# Patient Record
Sex: MALE | Race: AMERICAN INDIAN OR ALASKA NATIVE | NOT HISPANIC OR LATINO | Employment: OTHER | URBAN - METROPOLITAN AREA
[De-identification: names, ages, dates, MRNs, and addresses within clinical notes are randomized per-mention and may not be internally consistent; named-entity substitution may affect disease eponyms.]

---

## 2018-01-13 NOTE — RESULT NOTES
Message   call, labs ok     Verified Results  (1) COMPREHENSIVE METABOLIC PANEL [id number] 56:28VV Kate Epperson Order Number: [id number]      National Kidney Disease Education Program recommendations are as follows:  GFR calculation is accurate only with a steady state creatinine  Chronic Kidney disease less than 60 ml/min/1 73 sq  meters  Kidney failure less than 15 ml/min/1 73 sq  meters  Test Name Result Flag Reference   GLUCOSE,RANDM 86 mg/dL     If the patient is fasting, the ADA then defines impaired fasting glucose as > 100 mg/dL and diabetes as > or equal to 123 mg/dL  SODIUM 143 mmol/L  136-145   POTASSIUM 4 3 mmol/L  3 5-5 3   CHLORIDE 109 mmol/L H 100-108   CARBON DIOXIDE 28 mmol/L  21-32   ANION GAP (CALC) 6 mmol/L  4-13   BLOOD UREA NITROGEN 12 mg/dL  5-25   CREATININE 0 92 mg/dL  0 60-1 30   Standardized to IDMS reference method   CALCIUM 8 6 mg/dL  8 3-10 1   BILI, TOTAL 0 52 mg/dL  0 20-1 00   ALK PHOSPHATAS 61 U/L     ALT (SGPT) 24 U/L  12-78   AST(SGOT) 9 U/L  5-45   ALBUMIN 3 9 g/dL  3 5-5 0   TOTAL PROTEIN 7 1 g/dL  6 4-8 2   eGFR Non-African American      >60 0 ml/min/1 73sq m     (1) LIPID PANEL, FASTING 01Apr2016 09:32AM Kaya Guzman   PRASHANT Order Number: [id number]      Triglyceride:         Normal              <150 mg/dl       Borderline High    150-199 mg/dl       High               200-499 mg/dl       Very High          >499 mg/dl  Cholesterol:         Desirable        <200 mg/dl      Borderline High  200-239 mg/dl      High             >239 mg/dl  HDL Cholesterol:        High    >59 mg/dL      Low     <41 mg/dL     Test Name Result Flag Reference   CHOLESTEROL 187 mg/dL     HDL,DIRECT 50 mg/dL  40-60   LDL CHOLESTEROL CALCULATED 112 mg/dL H 0-100   TRIGLYCERIDES 125 mg/dL  <=150   Specimen collection should occur prior to N-Acetylcysteine or Metamizole administration due to the potential for falsely depressed results       (1) CBC/PLT/DIFF 01Apr2016 09:32AM Anuradha Mcmahon Order Number: [id number]    TW Order Number: [id number]     Test Name Result Flag Reference   WBC COUNT 7 38 Thousand/uL  4 31-10 16   RBC COUNT 5 10 Million/uL  3 88-5 62   HEMOGLOBIN 16 3 g/dL  12 0-17 0   HEMATOCRIT 47 2 %  36 5-49 3   MCV 93 fL  82-98   MCH 32 0 pg  26 8-34 3   MCHC 34 5 g/dL  31 4-37 4   RDW 13 5 %  11 6-15 1   MPV 11 7 fL  8 9-12 7   PLATELET COUNT 452 Thousands/uL  149-390   nRBC AUTOMATED 0 /100 WBCs     NEUTROPHILS RELATIVE PERCENT 58 %  43-75   LYMPHOCYTES RELATIVE PERCENT 33 %  14-44   MONOCYTES RELATIVE PERCENT 6 %  4-12   EOSINOPHILS RELATIVE PERCENT 3 %  0-6   BASOPHILS RELATIVE PERCENT 0 %  0-1   NEUTROPHILS ABSOLUTE COUNT 4 22 Thousands/µL  1 85-7 62   LYMPHOCYTES ABSOLUTE COUNT 2 43 Thousands/µL  0 60-4 47   MONOCYTES ABSOLUTE COUNT 0 43 Thousand/µL  0 17-1 22   EOSINOPHILS ABSOLUTE COUNT 0 25 Thousand/µL  0 00-0 61   BASOPHILS ABSOLUTE COUNT 0 03 Thousands/µL  0 00-0 10     (1) TSH 01Apr2016 09:32AM Amirah Waggoner    Order Number: [id number]    Patients undergoing fluorescein dye angiography may retain small amounts of fluorescein in the body for 48-72 hours post procedure  Samples containing fluorescein can produce falsely depressed TSH values  If the patient had this procedure,a specimen should be resubmitted post fluorescein clearance       Test Name Result Flag Reference   TSH 1 290 uIU/mL  0 358-3 740

## 2020-02-14 ENCOUNTER — APPOINTMENT (EMERGENCY)
Dept: RADIOLOGY | Facility: HOSPITAL | Age: 55
End: 2020-02-14
Payer: MEDICARE

## 2020-02-14 ENCOUNTER — HOSPITAL ENCOUNTER (EMERGENCY)
Facility: HOSPITAL | Age: 55
End: 2020-02-15
Attending: EMERGENCY MEDICINE | Admitting: EMERGENCY MEDICINE
Payer: MEDICARE

## 2020-02-14 ENCOUNTER — APPOINTMENT (EMERGENCY)
Dept: CT IMAGING | Facility: HOSPITAL | Age: 55
End: 2020-02-14
Payer: MEDICARE

## 2020-02-14 DIAGNOSIS — F31.9 BIPOLAR DISORDER (HCC): Primary | ICD-10-CM

## 2020-02-14 DIAGNOSIS — F20.9 SCHIZOPHRENIA (HCC): ICD-10-CM

## 2020-02-14 LAB
ALBUMIN SERPL BCP-MCNC: 3.7 G/DL (ref 3.5–5)
ALP SERPL-CCNC: 55 U/L (ref 46–116)
ALT SERPL W P-5'-P-CCNC: 31 U/L (ref 12–78)
ANION GAP SERPL CALCULATED.3IONS-SCNC: 13 MMOL/L (ref 4–13)
AST SERPL W P-5'-P-CCNC: 20 U/L (ref 5–45)
BASOPHILS # BLD AUTO: 0.02 THOUSANDS/ΜL (ref 0–0.1)
BASOPHILS NFR BLD AUTO: 0 % (ref 0–1)
BILIRUB DIRECT SERPL-MCNC: 0.34 MG/DL (ref 0–0.2)
BILIRUB SERPL-MCNC: 0.8 MG/DL (ref 0.2–1)
BUN SERPL-MCNC: 19 MG/DL (ref 5–25)
CALCIUM SERPL-MCNC: 9.3 MG/DL (ref 8.3–10.1)
CHLORIDE SERPL-SCNC: 102 MMOL/L (ref 100–108)
CO2 SERPL-SCNC: 24 MMOL/L (ref 21–32)
CREAT SERPL-MCNC: 1.1 MG/DL (ref 0.6–1.3)
EOSINOPHIL # BLD AUTO: 0.01 THOUSAND/ΜL (ref 0–0.61)
EOSINOPHIL NFR BLD AUTO: 0 % (ref 0–6)
ERYTHROCYTE [DISTWIDTH] IN BLOOD BY AUTOMATED COUNT: 13 % (ref 11.6–15.1)
ETHANOL EXG-MCNC: 0 MG/DL
GFR SERPL CREATININE-BSD FRML MDRD: 76 ML/MIN/1.73SQ M
GLUCOSE SERPL-MCNC: 90 MG/DL (ref 65–140)
HCT VFR BLD AUTO: 44.6 % (ref 36.5–49.3)
HGB BLD-MCNC: 15.2 G/DL (ref 12–17)
IMM GRANULOCYTES # BLD AUTO: 0.04 THOUSAND/UL (ref 0–0.2)
IMM GRANULOCYTES NFR BLD AUTO: 0 % (ref 0–2)
LYMPHOCYTES # BLD AUTO: 1.08 THOUSANDS/ΜL (ref 0.6–4.47)
LYMPHOCYTES NFR BLD AUTO: 12 % (ref 14–44)
MCH RBC QN AUTO: 30.8 PG (ref 26.8–34.3)
MCHC RBC AUTO-ENTMCNC: 34.1 G/DL (ref 31.4–37.4)
MCV RBC AUTO: 90 FL (ref 82–98)
MONOCYTES # BLD AUTO: 0.52 THOUSAND/ΜL (ref 0.17–1.22)
MONOCYTES NFR BLD AUTO: 6 % (ref 4–12)
NEUTROPHILS # BLD AUTO: 7.29 THOUSANDS/ΜL (ref 1.85–7.62)
NEUTS SEG NFR BLD AUTO: 82 % (ref 43–75)
NRBC BLD AUTO-RTO: 0 /100 WBCS
PLATELET # BLD AUTO: 233 THOUSANDS/UL (ref 149–390)
PMV BLD AUTO: 11 FL (ref 8.9–12.7)
POTASSIUM SERPL-SCNC: 3.6 MMOL/L (ref 3.5–5.3)
PROT SERPL-MCNC: 7.7 G/DL (ref 6.4–8.2)
RBC # BLD AUTO: 4.94 MILLION/UL (ref 3.88–5.62)
SODIUM SERPL-SCNC: 139 MMOL/L (ref 136–145)
WBC # BLD AUTO: 8.96 THOUSAND/UL (ref 4.31–10.16)

## 2020-02-14 PROCEDURE — 73560 X-RAY EXAM OF KNEE 1 OR 2: CPT

## 2020-02-14 PROCEDURE — 80048 BASIC METABOLIC PNL TOTAL CA: CPT | Performed by: EMERGENCY MEDICINE

## 2020-02-14 PROCEDURE — 36415 COLL VENOUS BLD VENIPUNCTURE: CPT | Performed by: EMERGENCY MEDICINE

## 2020-02-14 PROCEDURE — 70450 CT HEAD/BRAIN W/O DYE: CPT

## 2020-02-14 PROCEDURE — 82075 ASSAY OF BREATH ETHANOL: CPT | Performed by: EMERGENCY MEDICINE

## 2020-02-14 PROCEDURE — 96360 HYDRATION IV INFUSION INIT: CPT

## 2020-02-14 PROCEDURE — 93005 ELECTROCARDIOGRAM TRACING: CPT

## 2020-02-14 PROCEDURE — 99285 EMERGENCY DEPT VISIT HI MDM: CPT | Performed by: EMERGENCY MEDICINE

## 2020-02-14 PROCEDURE — 85025 COMPLETE CBC W/AUTO DIFF WBC: CPT | Performed by: EMERGENCY MEDICINE

## 2020-02-14 PROCEDURE — 99285 EMERGENCY DEPT VISIT HI MDM: CPT

## 2020-02-14 PROCEDURE — 80076 HEPATIC FUNCTION PANEL: CPT | Performed by: EMERGENCY MEDICINE

## 2020-02-14 RX ADMIN — SODIUM CHLORIDE 1000 ML: 0.9 INJECTION, SOLUTION INTRAVENOUS at 17:46

## 2020-02-14 NOTE — ED NOTES
Patient unable to maintain air stream sufficient for POCT alcohol breath test      Chen Barkley  02/14/20 4309

## 2020-02-14 NOTE — LETTER
[address] [facility] [address]  [address] [facility] [address]  Dept: [phone]      EMTALA TRANSFER CONSENT    NAME Huey Taylor                              1965                              MRN [id number]    I have been informed of my rights regarding examination, treatment, and transfer   by Dr Samantha Rincon MD    Benefits: Specialized equipment and/or services available at the receiving facility (Include comment)________________________    Risks: Potential for delay in receiving treatment      Consent for Transfer:  I acknowledge that my medical condition has been evaluated and explained to me by the emergency department physician or other qualified medical person and/or my attending physician, who has recommended that I be transferred to the service of  Accepting Physician: Dr Jaleel Connors at [address] [place] [address] Name, [address] : [address], [address] [place] [address], [place] [address]  The above potential benefits of such transfer, the potential risks associated with such transfer, and the probable risks of not being transferred have been explained to me, and I fully understand them  The doctor has explained that, in my case, the benefits of transfer outweigh the risks  I agree to be transferred  I authorize the performance of emergency medical procedures and treatments upon me in both transit and upon arrival at the receiving facility  Additionally, I authorize the release of any and all medical records to the receiving facility and request they be transported with me, if possible  I understand that the safest mode of transportation during a medical emergency is an ambulance and that the Hospital advocates the use of this mode of transport   Risks of traveling to the receiving facility by car, including absence of medical control, life sustaining equipment, such as oxygen, and medical personnel has been explained to me and I fully understand them     ([facility])  [X]  I consent to the stated transfer and to be transported by ambulance/helicopter  [  ]  I consent to the stated transfer, but refuse transportation by ambulance and accept full responsibility for my transportation by car  I understand the risks of non-ambulance transfers and I exonerate the Hospital and its staff from any deterioration in my condition that results from this refusal     X___________________________________________    DATE  02/15/20  TIME________  Signature of patient or legally responsible individual signing on patient behalf           RELATIONSHIP TO PATIENT_________________________          Provider Certification    NAME [address] [place] [address] 1965                              MRN [id number]    A medical screening exam was performed on the above named patient  Based on the examination:    Condition Necessitating Transfer The primary encounter diagnosis was Bipolar disorder ([facility] [address] )  A diagnosis of Schizophrenia ([facility] [address] ) was also pertinent to this visit      Patient Condition: The patient has been stabilized such that within reasonable medical probability, no material deterioration of the patient condition or the condition of the unborn child(juanito) is likely to result from the transfer    Reason for Transfer: Level of Care needed not available at this facility    Transfer Requirements: [address]  [place], [address] [place] [address] Drive, [address] [facility] [address]   · Space available and qualified personnel available for treatment as acknowledged by Chetna Adams, [address] [facility], [phone]  · Agreed to accept transfer and to provide appropriate medical treatment as acknowledged by       Dr Evert Toscano  · Appropriate medical records of the examination and treatment of the patient are provided at the time of transfer   [address] _______  · Transfer will be performed by qualified personnel from                              and appropriate transfer equipment as required, including the use of necessary and appropriate life support measures  Provider Certification: I have examined the patient and explained the following risks and benefits of being transferred/refusing transfer to the patient/family:         Based on these reasonable risks and benefits to the patient and/or the unborn child(juanito), and based upon the information available at the time of the patients examination, I certify that the medical benefits reasonably to be expected from the provision of appropriate medical treatments at another medical facility outweigh the increasing risks, if any, to the individuals medical condition, and in the case of labor to the unborn child, from effecting the transfer      X____________________________________________ DATE 02/15/20        TIME_______      ORIGINAL - SEND TO MEDICAL RECORDS   COPY - SEND WITH PATIENT DURING TRANSFER

## 2020-02-14 NOTE — ED PROVIDER NOTES
History  Chief Complaint   Patient presents with    Psychiatric Evaluation     pt brought via police  police report pt requesting 201 after being informed of arrest  pt states " i am god " and that his medications are "off " pt reports taking medications daily  denies SI/HI/VH/AH  HPI    56-year-old male with a chief complaint that "He is God"  Patient is disheveled covered in mud and is in the room with the  and apparently patient was being arrested however he requested to come to the hospital after he knew of his arrest   Patient states that he is bipolar manic-depressive and schizophrenic and is supposed to be taking Zyprexa & does not remember the last time he took it  Patient states he was in his car and all he wants to do is pray and read the Bible because "he is God "  Patient states that he has 3 children and 2 of them are supposed to come from [place] tomorrow to see him  Patient states that he is   Patient is difficult to understand and "mumbles"  Patient denies any suicidal thoughts and denies any homicidal thoughts  Patient states that he does not want to hurt anyone  Patient denies any drug use but states he has used it in the past a long time ago  Patient denies any alcohol use  Patient does state that he used to go to the bar but he has not been there in a while  None       Past Medical History:   Diagnosis Date    Bipolar disorder ([facility] [address] )     [facility]     Psychiatric disorder     Schizophrenia ([facility] )        History reviewed  No pertinent surgical history  History reviewed  No pertinent family history  I have reviewed and agree with the history as documented  Social History     Tobacco Use    Smoking status: Unknown If Ever Smoked    Smokeless tobacco: Never Used   Substance Use Topics    Alcohol use: Yes    Drug use: Yes     Types: Methamphetamines       Review of Systems   Constitutional: Negative for diaphoresis, fatigue and fever  HENT: Negative for congestion, ear pain, nosebleeds and sore throat          + head pain   Eyes: Negative for photophobia, pain, discharge and visual disturbance  Respiratory: Negative for cough, choking, chest tightness, shortness of breath and wheezing  Cardiovascular: Negative for chest pain and palpitations  Gastrointestinal: Negative for abdominal distention, abdominal pain, diarrhea and vomiting  Genitourinary: Negative for dysuria, flank pain and frequency  Musculoskeletal: Positive for arthralgias and myalgias  Negative for back pain, gait problem and joint swelling         + knee pain   Skin: Negative for color change and rash  Neurological: Negative for dizziness, syncope and headaches  Psychiatric/Behavioral: Negative for behavioral problems, confusion and suicidal ideas  The patient is not nervous/anxious  + delusions - "thinks he is God"   All other systems reviewed and are negative  Physical Exam  Physical Exam   Constitutional: He is oriented to person, place, and time  60-year-old disheveled male poorly groomed presents handcuffed to the stretcher with police at his side  Patient keeps repeating that "He is God"  Patient is covered with wet mud  HENT:   Head: Normocephalic  Mouth/Throat: Oropharynx is clear and moist    Patient complains of a bump to the left side of his head  Eyes: Pupils are equal, round, and reactive to light  EOM are normal    Positive cataract surgery   Neck: Normal range of motion  Neck supple  Cardiovascular: Regular rhythm and normal heart sounds  Slightly tachy   Pulmonary/Chest: Effort normal and breath sounds normal  No stridor  No respiratory distress  He has no wheezes  He has no rales  Abdominal: Soft  Bowel sounds are normal  He exhibits no distension  There is no tenderness  There is no guarding  Patient's abdomen is also covered in mud  Musculoskeletal: Normal range of motion  He exhibits tenderness     Patient has tenderness to bilateral knees but is able to flex and extend them without difficulty  Neurological: He is alert and oriented to person, place, and time  No cranial nerve deficit  He exhibits normal muscle tone  Skin: Skin is warm and dry  Psychiatric:   Patient keeps mumbling that "He is God"  Patient denies any suicidal or homicidal ideations  Nursing note and vitals reviewed        Vital Signs  ED Triage Vitals [02/14/20 1614]   Temperature Pulse Respirations Blood Pressure SpO2   98 6 °F (37 °C) 102 12 121/85 98 %      Temp Source Heart Rate Source Patient Position - Orthostatic VS BP Location FiO2 (%)   Oral Monitor Lying Left arm --      Pain Score       No Pain           Vitals:    02/14/20 1614 02/14/20 1937   BP: 121/85 120/78   Pulse: 102 89   Patient Position - Orthostatic VS: Lying Lying         Visual Acuity      ED Medications  Medications   sodium chloride 0 9 % bolus 1,000 mL (0 mL Intravenous Stopped 2/14/20 1905)       Diagnostic Studies  Results Reviewed     Procedure Component Value Units Date/Time    POCT alcohol breath test [[id number]]  (Normal) Resulted:  02/14/20 1935    Lab Status:  Final result Updated:  02/14/20 1935     EXTBreath Alcohol 0 000    Ethanol [[id number]]     Lab Status:  No result Specimen:  Blood     Basic metabolic panel [[id number]] Collected:  02/14/20 1747    Lab Status:  Final result Specimen:  Blood from Arm, Left Updated:  02/14/20 1808     Sodium 139 mmol/L      Potassium 3 6 mmol/L      Chloride 102 mmol/L      CO2 24 mmol/L      ANION GAP 13 mmol/L      BUN 19 mg/dL      Creatinine 1 10 mg/dL      Glucose 90 mg/dL      Calcium 9 3 mg/dL      eGFR 76 ml/min/1 73sq m     Narrative:       [facility] guidelines for Chronic Kidney Disease (CKD):     Stage 1 with normal or high GFR (GFR > 90 mL/min/1 73 square meters)    Stage 2 Mild CKD (GFR = 60-89 mL/min/1 73 square meters)    Stage 3A Moderate CKD (GFR = 45-59 mL/min/1 73 square meters)    Stage 3B Moderate CKD (GFR = 30-44 mL/min/1 73 square meters)    Stage 4 Severe CKD (GFR = 15-29 mL/min/1 73 square meters)    Stage 5 End Stage CKD (GFR <15 mL/min/1 73 square meters)  Note: GFR calculation is accurate only with a steady state creatinine    Hepatic function panel [[id number]]  (Abnormal) Collected:  02/14/20 1747    Lab Status:  Final result Specimen:  Blood from Arm, Left Updated:  02/14/20 1808     Total Bilirubin 0 80 mg/dL      Bilirubin, Direct 0 34 mg/dL      Alkaline Phosphatase 55 U/L      AST 20 U/L      ALT 31 U/L      Total Protein 7 7 g/dL      Albumin 3 7 g/dL     CBC and differential [[id number]]  (Abnormal) Collected:  02/14/20 1747    Lab Status:  Final result Specimen:  Blood from Arm, Left Updated:  02/14/20 1755     WBC 8 96 Thousand/uL      RBC 4 94 Million/uL      Hemoglobin 15 2 g/dL      Hematocrit 44 6 %      MCV 90 fL      MCH 30 8 pg      MCHC 34 1 g/dL      RDW 13 0 %      MPV 11 0 fL      Platelets 870 Thousands/uL      nRBC 0 /100 WBCs      Neutrophils Relative 82 %      Immat GRANS % 0 %      Lymphocytes Relative 12 %      Monocytes Relative 6 %      Eosinophils Relative 0 %      Basophils Relative 0 %      Neutrophils Absolute 7 29 Thousands/µL      Immature Grans Absolute 0 04 Thousand/uL      Lymphocytes Absolute 1 08 Thousands/µL      Monocytes Absolute 0 52 Thousand/µL      Eosinophils Absolute 0 01 Thousand/µL      Basophils Absolute 0 02 Thousands/µL     Rapid drug screen, urine [[id number]]     Lab Status:  No result Specimen:  Urine     UA w Reflex to Microscopic w Reflex to Culture [[id number]]     Lab Status:  No result Specimen:  Urine                  XR knee 1 or 2 vw right   ED Interpretation by Kyle Cardona DO (02/14 1840)   Neg fx       by Amparo Knox (02/14 1742)      XR knee 1 or 2 vw left   ED Interpretation by Kyle Cardona DO (02/14 1841)   Neg fx       by Amparo Knox (02/14 1742)      CT head without contrast   Final Result by Lakeshia Hoskins MD (02/14 1710)      No acute intracranial abnormality  Workstation performed: [id number]                    Procedures  Procedures         ED Course  ED Course as of Feb 14 2013 Fri Feb 14, 2020   1856 CO2: 24        6:45 p m :  I reviewed the x-rays of his knees and the CT scan of his head as well as his labs  Patient is medically cleared for inpatient psychiatric facility  Patient states that he wants to be admitted and has been off his meds for a while  I spoke with Magdalenaakhil Rocaelgertrudis in crisis and we will move patient to secure holding  7:10 p m :  I spoke with patient and patient understands and agrees to be admitted to a psychiatric facility  Patient is homicidal or suicidal and agrees to sign a 12  Patient states that he does not want to kill himself and does not want to hurt anyone else  Patient is drinking water and did not give a urine yet  I also discussed with him that a  may come in and arraigned him on charges and if he understood what that meant  Patient states he did  Patient states he wants to see the crisis worker and was" just reading the Bible"  7:45 PM:  Care transferred to Dr Kristyn Welsh              MDM     Differential diagnosis includes:  1  History of schizophrenia  2  History of bipolar disorder  3  History of manic depression       Disposition  Final diagnoses:   Bipolar disorder ([facility] [address] )   Schizophrenia ([facility] )     Time reflects when diagnosis was documented in both MDM as applicable and the Disposition within this note     Time User Action Codes Description Comment    2/14/2020  8:10 PM Goldie Miller Add [F31 9] Bipolar disorder ([facility] [address] )     2/14/2020  8:10 PM Goldie Miller Add [F20 9] Schizophrenia [facility])       ED Disposition     None      Follow-up Information    None         Patient's Medications    No medications on file     No discharge procedures on file      PDMP Review     None          ED Provider  Electronically Signed by Steve Richards DO  02/14/20 2013

## 2020-02-15 ENCOUNTER — HOSPITAL ENCOUNTER (INPATIENT)
Facility: HOSPITAL | Age: 55
LOS: 18 days | Discharge: HOME | DRG: 885 | End: 2020-03-04
Attending: PSYCHIATRY & NEUROLOGY | Admitting: PSYCHIATRY & NEUROLOGY
Payer: MEDICARE

## 2020-02-15 VITALS
HEIGHT: 73 IN | WEIGHT: 230 LBS | OXYGEN SATURATION: 95 % | TEMPERATURE: 98 F | SYSTOLIC BLOOD PRESSURE: 119 MMHG | HEART RATE: 71 BPM | DIASTOLIC BLOOD PRESSURE: 72 MMHG | BODY MASS INDEX: 30.48 KG/M2 | RESPIRATION RATE: 16 BRPM

## 2020-02-15 DIAGNOSIS — G47.00 INSOMNIA: ICD-10-CM

## 2020-02-15 DIAGNOSIS — Z72.0 TOBACCO ABUSE: ICD-10-CM

## 2020-02-15 DIAGNOSIS — F20.9 SCHIZOPHRENIA (HCC): Primary | ICD-10-CM

## 2020-02-15 DIAGNOSIS — F31.9 BIPOLAR DISORDER (HCC): ICD-10-CM

## 2020-02-15 PROCEDURE — 96372 THER/PROPH/DIAG INJ SC/IM: CPT

## 2020-02-15 RX ORDER — RISPERIDONE 0.5 MG/1
0.5 TABLET, ORALLY DISINTEGRATING ORAL EVERY 8 HOURS PRN
Status: CANCELLED | OUTPATIENT
Start: 2020-02-15

## 2020-02-15 RX ORDER — HYDROXYZINE HYDROCHLORIDE 25 MG/1
25 TABLET, FILM COATED ORAL EVERY 6 HOURS PRN
Status: DISCONTINUED | OUTPATIENT
Start: 2020-02-15 | End: 2020-03-04 | Stop reason: HOSPADM

## 2020-02-15 RX ORDER — OLANZAPINE 2.5 MG/1
10 TABLET ORAL
Status: CANCELLED | OUTPATIENT
Start: 2020-02-15

## 2020-02-15 RX ORDER — OLANZAPINE 5 MG/1
5 TABLET ORAL EVERY 12 HOURS PRN
Status: DISCONTINUED | OUTPATIENT
Start: 2020-02-15 | End: 2020-03-04 | Stop reason: HOSPADM

## 2020-02-15 RX ORDER — HYDROXYZINE HYDROCHLORIDE 25 MG/1
25 TABLET, FILM COATED ORAL EVERY 6 HOURS PRN
Status: CANCELLED | OUTPATIENT
Start: 2020-02-15

## 2020-02-15 RX ORDER — LORAZEPAM 1 MG/1
1 TABLET ORAL EVERY 8 HOURS PRN
Status: CANCELLED | OUTPATIENT
Start: 2020-02-15

## 2020-02-15 RX ORDER — HALOPERIDOL 1 MG/1
2 TABLET ORAL EVERY 8 HOURS PRN
Status: CANCELLED | OUTPATIENT
Start: 2020-02-15

## 2020-02-15 RX ORDER — HALOPERIDOL 5 MG/ML
10 INJECTION INTRAMUSCULAR ONCE
Status: COMPLETED | OUTPATIENT
Start: 2020-02-15 | End: 2020-02-15

## 2020-02-15 RX ORDER — RISPERIDONE 0.5 MG/1
0.5 TABLET, ORALLY DISINTEGRATING ORAL EVERY 8 HOURS PRN
Status: DISCONTINUED | OUTPATIENT
Start: 2020-02-15 | End: 2020-02-18

## 2020-02-15 RX ORDER — TRAZODONE HYDROCHLORIDE 50 MG/1
25 TABLET ORAL
Status: CANCELLED | OUTPATIENT
Start: 2020-02-15

## 2020-02-15 RX ORDER — TRAZODONE HYDROCHLORIDE 50 MG/1
25 TABLET ORAL
Status: DISCONTINUED | OUTPATIENT
Start: 2020-02-15 | End: 2020-03-04 | Stop reason: HOSPADM

## 2020-02-15 RX ORDER — LORAZEPAM 1 MG/1
1 TABLET ORAL EVERY 8 HOURS PRN
Status: DISCONTINUED | OUTPATIENT
Start: 2020-02-15 | End: 2020-03-04 | Stop reason: HOSPADM

## 2020-02-15 RX ORDER — OLANZAPINE 2.5 MG/1
5 TABLET ORAL EVERY 12 HOURS PRN
Status: CANCELLED | OUTPATIENT
Start: 2020-02-15

## 2020-02-15 RX ORDER — HALOPERIDOL 1 MG/1
2 TABLET ORAL EVERY 8 HOURS PRN
Status: DISCONTINUED | OUTPATIENT
Start: 2020-02-15 | End: 2020-03-04 | Stop reason: HOSPADM

## 2020-02-15 RX ORDER — OLANZAPINE 10 MG/1
10 TABLET ORAL
Status: DISCONTINUED | OUTPATIENT
Start: 2020-02-15 | End: 2020-02-16

## 2020-02-15 RX ADMIN — HALOPERIDOL LACTATE 10 MG: 5 INJECTION INTRAMUSCULAR at 16:32

## 2020-02-15 RX ADMIN — HALOPERIDOL LACTATE 10 MG: 5 INJECTION INTRAMUSCULAR at 09:31

## 2020-02-15 NOTE — ED NOTES
CW notes pt refused to sign the EMTALA at this time  Pt states, "I am not crazy I am the real deal and I don't need to be in the looney bin"  CW placed call to intake, spoke w/Hadley  CW advised Lucy Wilkins, the ED doctor will petition a 302 at this time, as pt is refusing to be transferred or receive treatment  CW adds, constable is present to transport pt and pt is refusing  This writer notes, Barber Day reports he can transport pt if pt will be transported on a 302 commitment       TDS, CW

## 2020-02-15 NOTE — ED NOTES
CW spoke w/pt briefly regarding the need for a urine sample  Pt appears unable to understand the need for the urine  Pt stated, "I am who I am and I speak the truth if you want to put me in alf then you can do so"  CW advised by cesar, pt had voided on floor earlier       TDS, CW

## 2020-02-15 NOTE — ED NOTES
Pt resting comfortably at this time  Water and food offered, but pt denies wanting anything to eat       Marleni Ahmadi  02/15/20 6198

## 2020-02-15 NOTE — ED NOTES
CW presented with a copy of his rights and presented him with a 201 obtained signature and signature of attending    JK-CIS

## 2020-02-15 NOTE — ED NOTES
CW received return call from [address] [place] Adonis Martinez of [employer]  CW advised Officer Tressa Hoang pt will be transported to [facility] for IP treatment  Officer Tressa Hoang requested CW note in pt's chart, [employer] be notified upon pt's discharge from hospital  CW advised officer, note would be placed within chart as per requested      TDS, CW

## 2020-02-15 NOTE — ED NOTES
302 petitioned by Bonnie Vines and Lisa Bae  CW completed Rights, CRF and ACT 77 and faxed them all to JAMAR Vines ordered IM meds for pt, and as soon as they have taken effect pt will be transported by CTS to [facility]      Delbert [facility], [address]  02/15/20 17:10

## 2020-02-15 NOTE — ED NOTES
CIS spoke to charge nurse, Jazmin Sanchez who reports that patient still refuses to provide urine sample and has been asleep part of the night

## 2020-02-15 NOTE — ED NOTES
Pt became very agitated upon arrival of CTS, provider and charge RN at bedside       Balwinder Hernandez  02/15/20 6600

## 2020-02-15 NOTE — ED NOTES
CW received return call from Svetlana w/[facility]  ETA is 16:00 w/CTS  CW provided intake w/updated ETA  CW notes transportation paperwork is completed by doctor and pt will sign upon waking up       TDS, CW

## 2020-02-15 NOTE — ED NOTES
CW placed call to intake, inquiring on chart review on inability to obtain urine sample  CW directed to contact police and obtain additional details regarding possible charges  CW placed call to [facility] to obtain information regarding possible pending charges, as per dispatcher, initially unable to locate information  Dispatcher found request and will have police return call to this writer      DILIP LANDEROS

## 2020-02-15 NOTE — ED NOTES
CW placed call to [facility] requesting a return call from [facility] regarding disposition update  As per dispatcher, a request for return call will be placed      TDS, DILIP

## 2020-02-15 NOTE — ED NOTES
As per officer Alexis Melchor, ED is to contact [address] police department at [phone] when pt leaves treatment facility  Pt has pending charges        Sera Ko RN  02/14/20 325 MARQUITA Willard RN  02/14/20 1955

## 2020-02-15 NOTE — ED NOTES
Pt has thrown urine specimen cup and urine on the floor and on the walls in the bathroom  Pt continues to be uncooperative when asked to provide a urine sample  He is resting comfortably in his bed following this event  Charge nurse and EVS made aware at this time       Shayla Rodríguez  02/15/20 1021

## 2020-02-15 NOTE — ED NOTES
CW met with David Jackson who presented to the ED via police  CW completed assessment  He is a 78-year-old male that "He is God"  Patient is disheveled, covered in mud  Patient states that he is bipolar, manic-depressive and schizophrenic and has not been taking medications  He stated he has an appointmetn at the end of March  Patient states he was in his car and all he wants to do is pray and read the Bible because "he is God "  Patient states that he has 3 children and 2 of them are supposed to come from [place] tomorrow to see him  Patient states that he is   Patient is difficult to understand and "mumbles"  Patient denies any suicidal and homicidal thoughts  Patient states that he does not want to hurt anyone  Patient denies any drug use but states he has used it in the past a long time ago  Patient denies any alcohol use  Patient would like to sign himself inpatient       JK-CIS

## 2020-02-15 NOTE — ED NOTES
CW received update from intake  Pt is accepted to - [facility] under the care of Dr April Haskins  Pt may arrive to unit anytime after 16:00      CW to contact [facility], william hunter/Lewis  As per Elaina Price will call back with ETA once available       Nurse to nurse report required to: [phone], one hr prior to pt's arrival     TDS, CW

## 2020-02-15 NOTE — ED NOTES
CW received return call from intake  CW may re-send 201 and EKG results to intake for review   SENT    TDS, CW

## 2020-02-15 NOTE — ED NOTES
CW received return call from [employer] [facility] [employer]  As per [employer], "there was a discrepancy from day shift to night shift and Officer Christina Hedrick was not on either yesterday  Officer Christina Hedrick adds, pt is facing possible misdemeanor charges, trespassing, eluding, possession of a controlled substance, etc "  Officer Christina Hedrick adds, "[employer] would like a call when pt is transferred, not to arrest him or detain him but to have him arraigned  CW may contact control center and request a return phone call to provide police w/dispo"       TDS, CW

## 2020-02-15 NOTE — ED NOTES
CW notes, pt appeared slightly agitated a short while ago but easily appeared to calm down  CW notes, pt "is requesting a preacher so he can learn about God"       TDS, CW

## 2020-02-16 ENCOUNTER — APPOINTMENT (INPATIENT)
Dept: RADIOLOGY | Facility: HOSPITAL | Age: 55
DRG: 885 | End: 2020-02-16
Payer: MEDICARE

## 2020-02-16 PROBLEM — R07.82 INTERCOSTAL PAIN: Status: ACTIVE | Noted: 2020-02-16

## 2020-02-16 PROBLEM — E78.2 MIXED HYPERLIPIDEMIA: Status: ACTIVE | Noted: 2020-02-16

## 2020-02-16 PROBLEM — F12.10 MARIJUANA ABUSE: Status: ACTIVE | Noted: 2020-02-16

## 2020-02-16 PROBLEM — F20.0 PARANOID SCHIZOPHRENIA (HCC): Status: ACTIVE | Noted: 2020-02-16

## 2020-02-16 PROBLEM — Z72.0 TOBACCO ABUSE: Status: ACTIVE | Noted: 2020-02-16

## 2020-02-16 LAB
ALBUMIN SERPL BCP-MCNC: 3.5 G/DL (ref 3–5.2)
ALP SERPL-CCNC: 48 U/L (ref 43–122)
ALT SERPL W P-5'-P-CCNC: 31 U/L (ref 9–52)
AMPHETAMINES SERPL QL SCN: POSITIVE
ANION GAP SERPL CALCULATED.3IONS-SCNC: 14 MMOL/L (ref 5–14)
AST SERPL W P-5'-P-CCNC: 58 U/L (ref 17–59)
BARBITURATES UR QL: NEGATIVE
BASOPHILS # BLD AUTO: 0 THOUSANDS/ΜL (ref 0–0.1)
BASOPHILS NFR BLD AUTO: 1 % (ref 0–1)
BENZODIAZ UR QL: NEGATIVE
BILIRUB SERPL-MCNC: 1.1 MG/DL
BUN SERPL-MCNC: 12 MG/DL (ref 5–25)
CALCIUM SERPL-MCNC: 8.8 MG/DL (ref 8.4–10.2)
CHLORIDE SERPL-SCNC: 106 MMOL/L (ref 97–108)
CHOLEST SERPL-MCNC: 105 MG/DL
CO2 SERPL-SCNC: 15 MMOL/L (ref 22–30)
COCAINE UR QL: NEGATIVE
CREAT SERPL-MCNC: 0.78 MG/DL (ref 0.7–1.5)
EOSINOPHIL # BLD AUTO: 0.2 THOUSAND/ΜL (ref 0–0.4)
EOSINOPHIL NFR BLD AUTO: 4 % (ref 0–6)
ERYTHROCYTE [DISTWIDTH] IN BLOOD BY AUTOMATED COUNT: 13.9 %
GFR SERPL CREATININE-BSD FRML MDRD: 103 ML/MIN/1.73SQ M
GLUCOSE P FAST SERPL-MCNC: 58 MG/DL (ref 70–99)
GLUCOSE SERPL-MCNC: 130 MG/DL (ref 65–140)
GLUCOSE SERPL-MCNC: 58 MG/DL (ref 70–99)
HCT VFR BLD AUTO: 44.7 % (ref 41–53)
HDLC SERPL-MCNC: 35 MG/DL
HGB BLD-MCNC: 15.3 G/DL (ref 13.5–17.5)
LDLC SERPL CALC-MCNC: 58 MG/DL
LYMPHOCYTES # BLD AUTO: 1.6 THOUSANDS/ΜL (ref 0.5–4)
LYMPHOCYTES NFR BLD AUTO: 30 % (ref 25–45)
MCH RBC QN AUTO: 31.8 PG (ref 26–34)
MCHC RBC AUTO-ENTMCNC: 34.3 G/DL (ref 31–36)
MCV RBC AUTO: 93 FL (ref 80–100)
METHADONE UR QL: NEGATIVE
MONOCYTES # BLD AUTO: 0.4 THOUSAND/ΜL (ref 0.2–0.9)
MONOCYTES NFR BLD AUTO: 8 % (ref 1–10)
NEUTROPHILS # BLD AUTO: 3.2 THOUSANDS/ΜL (ref 1.8–7.8)
NEUTS SEG NFR BLD AUTO: 58 % (ref 45–65)
NONHDLC SERPL-MCNC: 70 MG/DL
OPIATES UR QL SCN: NEGATIVE
PCP UR QL: NEGATIVE
PLATELET # BLD AUTO: 205 THOUSANDS/UL (ref 150–450)
PMV BLD AUTO: 10.2 FL (ref 8.9–12.7)
POTASSIUM SERPL-SCNC: 4.1 MMOL/L (ref 3.6–5)
PROT SERPL-MCNC: 6.9 G/DL (ref 5.9–8.4)
RBC # BLD AUTO: 4.81 MILLION/UL (ref 4.5–5.9)
SODIUM SERPL-SCNC: 135 MMOL/L (ref 137–147)
THC UR QL: POSITIVE
TRIGL SERPL-MCNC: 60 MG/DL
WBC # BLD AUTO: 5.4 THOUSAND/UL (ref 4.5–11)

## 2020-02-16 PROCEDURE — 80053 COMPREHEN METABOLIC PANEL: CPT | Performed by: PSYCHIATRY & NEUROLOGY

## 2020-02-16 PROCEDURE — 71046 X-RAY EXAM CHEST 2 VIEWS: CPT

## 2020-02-16 PROCEDURE — 80061 LIPID PANEL: CPT | Performed by: FAMILY MEDICINE

## 2020-02-16 PROCEDURE — 99253 IP/OBS CNSLTJ NEW/EST LOW 45: CPT | Performed by: FAMILY MEDICINE

## 2020-02-16 PROCEDURE — 85025 COMPLETE CBC W/AUTO DIFF WBC: CPT | Performed by: PSYCHIATRY & NEUROLOGY

## 2020-02-16 PROCEDURE — 80307 DRUG TEST PRSMV CHEM ANLYZR: CPT | Performed by: PSYCHIATRY & NEUROLOGY

## 2020-02-16 PROCEDURE — 99222 1ST HOSP IP/OBS MODERATE 55: CPT | Performed by: PSYCHIATRY & NEUROLOGY

## 2020-02-16 PROCEDURE — 82948 REAGENT STRIP/BLOOD GLUCOSE: CPT

## 2020-02-16 RX ADMIN — OLANZAPINE 15 MG: 5 TABLET, FILM COATED ORAL at 21:14

## 2020-02-16 NOTE — TREATMENT PLAN
TREATMENT PLAN REVIEW - [facility] [place] 49 y o  1965 male MRN: [id number]    [address] [address] [address] [facility] Room / Bed: [place] [facility] Encounter: [id number]        Admit Date/Time:  2/15/2020  6:32 PM    Treatment Team: Attending Provider: Jase Torres MD; Registered Nurse: Pepper Ring RN; Patient Care Assistant: Zack Bolton    Diagnosis: Principal Problem:    Paranoid schizophrenia [facility]    Patient Strengths/Assets: capable of independent living, communication skills, good past treatment response      Patient Barriers/Limitations: chronic mental illness, homeless, lack of social/family support, noncompliant with treatment    Short Term Goals: decrease in anxiety symptoms, decrease in paranoid thoughts    Long Term Goals: resolution of psychotic symptoms, improved reality testing, improved reasoning ability    Progress Towards Goals: starting psychiatric medications as prescribed    Recommended Treatment: medication management, patient medication education, group therapy, milieu therapy, continued Behavioral Health psychiatric evaluation/assessment process     Treatment Frequency: daily medication monitoring, group and milieu therapy daily, monitoring through interdisciplinary rounds, monitoring through weekly patient care conferences    Expected Discharge Date: 14 days - 3/1/2020    Discharge Plan: referral for outpatient medication management with a psychiatrist, referral for outpatient psychotherapy    Treatment Plan Created/Updated By: Maria Luisa Wagner MD

## 2020-02-16 NOTE — NURSING NOTE
Patient out of room for meals only but did requests supplies to take a shower and completed same independently and without difficulty  No complaints of pain or discomfort but patient did complain of Rib pain to Dr Yonatan Mahmood with new order for chest x-ray noted to rule out fracture  New order also for Lipid Profile today and CMP, CK level tomorrow  New order from psychiatry for Zyprexa 15 mg PO q HS  Patient also still needs to give staff a urine sample for UDS test ordered yesterday and not obtained by [facility] ED because patient refused and threw urine cup against the wall  Patient's appetite is good with patient eating 100% of breakfast and 75% of lunch  Patient refused to discuss his current status only saying the police threw him to the ground and brought him to the hospital after that  Patient would not discuss his current beliefs with this nurse  No other issues noted at this time

## 2020-02-16 NOTE — CMS CERTIFICATION NOTE
Certification: Based upon physical, mental and social evaluations, I certify that inpatient psychiatric services are medically necessary for this patient for a duration of 14 midnights for the treatment of psychosis  Available alternative community resources do not meet the patient's mental health care needs  I further attest that an established written individualized plan of care has been implemented and is outlined in the patient's medical records

## 2020-02-16 NOTE — PLAN OF CARE
Problem: PSYCHOSIS  Goal: Will report no hallucinations or delusions  Description  Interventions:  - Administer medication as  ordered  - Every waking shifts and PRN assess for the presence of hallucinations and or delusions  - Assist with reality testing to support increasing orientation  - Assess if patient's hallucinations or delusions are encouraging self-harm or harm to others and intervene as appropriate  Outcome: Progressing     Problem: SELF CARE DEFICIT  Goal: Return ADL status to a safe level of function  Description  INTERVENTIONS:  - Administer medication as ordered  - Assess ADL deficits and provide assistive devices as needed  - Obtain PT/OT consults as needed  - Assist and instruct patient to increase activity and self care as tolerated  Outcome: Progressing     Problem: Alteration in Thoughts and Perception  Goal: Treatment Goal: Gain control of psychotic behaviors/thinking, reduce/eliminate presenting symptoms and demonstrate improved reality functioning upon discharge  Outcome: Progressing  Goal: Verbalize thoughts and feelings  Description  Interventions:  - Promote a nonjudgmental and trusting relationship with the patient through active listening and therapeutic communication  - Assess patient's level of functioning, behavior and potential for risk  - Engage patient in 1 on 1 interactions  - Encourage patient to express fears, feelings, frustrations, and discuss symptoms    - [place] patient to reality, help patient recognize reality-based thinking   - Administer medications as ordered and assess for potential side effects  - Provide the patient education related to the signs and symptoms of the illness and desired effects of prescribed medications  Outcome: Progressing  Goal: Refrain from acting on delusional thinking/internal stimuli  Description  Interventions:  - Monitor patient closely, per order   - Utilize least restrictive measures   - Set reasonable limits, give positive feedback for acceptable   - Administer medications as ordered and monitor of potential side effects  Outcome: Progressing  Goal: Agree to be compliant with medication regime, as prescribed and report medication side effects  Description  Interventions:  - Offer appropriate PRN medication and supervise ingestion; conduct AIMS, as needed   Outcome: Progressing  Goal: Attend and participate in unit activities, including therapeutic, recreational, and educational groups  Description  Interventions:  -Encourage Visitation and family involvement in care  Outcome: Progressing  Goal: Recognize dysfunctional thoughts, communicate reality-based thoughts at the time of discharge  Description  Interventions:  - Provide medication and psycho-education to assist patient in compliance and developing insight into his/her illness   Outcome: Progressing  Goal: Complete daily ADLs, including personal hygiene independently, as able  Description  Interventions:  - Observe, teach, and assist patient with ADLS  - Monitor and promote a balance of rest/activity, with adequate nutrition and elimination   Outcome: Progressing

## 2020-02-16 NOTE — H&P
Psychiatric Evaluation - Behavioral Health     Identification Data:Willis Taylro 49 y o  male MRN: [id number]  Unit/Bed#: [facility] [address]-75 Encounter: [id number]    Chief Complaint: "I am GOD"    History of present illness:    Patient is a 48 yo male brought in by police to [facility], due to psychosis  He was disheveled and still is, but was covered in mud  He has and still has rambling delusional speech, talking about being GOD and making statements about how he was persecuted  Talking so fast its hard to make out, mostly nonsensical and paranoid  Part of reason coming here was to avoid being arrested by police  He has h/o psychosis and stated he had been on Zyprexa in past, but downplays the need for any medications due to his psychosis  He denies SI/HI, denies A/V murrieta  Says his is sleeping and eating well  Has drug use, and is refusing UDS, prior to coming here  He denies having periods of lots of energy or depression in present or past     Psychiatric Review Of Systems:  Change in sleep: no  Appetite changes: no  Weight changes: no  Change in energy/anergy: yes  Change in interest/pleasure/anhedonia: no  Somatic symptoms: no  Anxiety/panic: yes  Manic symptoms: yes  Guilt feelings:no  Hopeless: no  Self injurious behavior/risky behavior: no    Historical Information     Past Psychiatric History:   Previously diagnosed with Schizophrenia dna Bipolar D/O, but appears more Schizo than Bipolar  Says he was hospitalized about 8 or 9 times for psychosis  First hospitalized about 15 or 16 years ago  Only medication he can remember is Zyprexa  Substance Abuse History:  Reports abusing Crystal Meth and marijuana, both off and on for many years, can't remember the last use of either  No h/o Alcohol or other drug abuse, per his report      Family Psychiatric History:   Unknown    Social History:  Developmental: born and raised in [place]  Education: high school diploma/GED  Marital history:  x 2,  x1,  at present  Living arrangement, social support: friend(s) who he pays rent to in [place]  Occupational History: on permanent disability  Access to firearms: none reported    Traumatic History:   Abuse:none is reported  Other Traumatic Events: none is reported    Past Medical History:   Diagnosis Date    [facility]ipolar disorder ([facility] )     Manic, depressive ([facility] )     Psychiatric disorder     Schizophrenia ([facility] )        Medical Review Of Systems:  A comprehensive review of systems was negative except for: Behavioral/Psych: positive for Symptoms; Pyschiatric: psychosis    Meds/Allergies   no meds prior to admission  Allergies   Allergen Reactions    Amoxicillin Dizziness    Penicillins      Objective      Mental Status Evaluation:  Appearance:  {disheveled   Behavior:  evasive, psychomotor agitation and uncooperative   Speech:   Language Loud and Pressured  Able to name objects and Able to repeat phrases   Mood:  irritable and anxious   Affect:    Thought process mood-congruent  Tangential, disorganized and Flight of ideas   Associations: Loosely connected   Thought Content:  Paranoid and mistrustful and Delusions of persecution   Perceptual Disturbances: Denies hallucinations and does not appear to be responding to internal stimuli   Risk Potential: No suicidal or homicidal ideation   Orientation  Oriented x 3   Memory Short term intact and Long term intact   Attention/Concentration attention span and concentration were age appropriate   Fund of knowledge Aware of past history and vocabulary Average   Insight:  limited   Judgment: Limited   Gait/Station: normal gait/station   Motor Activity: No abnormal movement noted         Lab Results:   CBC:   Lab Results   Component Value Date    WBC 5 40 02/16/2020    HGB 15 3 02/16/2020    HCT 44 7 02/16/2020    MCV 93 02/16/2020     02/16/2020    MCH 31 8 02/16/2020    MCHC 34 3 02/16/2020    RDW 13 9 02/16/2020    MPV 10 2 02/16/2020   , BMP/CMP:   Lab Results   Component Value Date    K 4 1 02/16/2020     02/16/2020    CO2 15 (L) 02/16/2020    BUN 12 02/16/2020    CREATININE 0 78 02/16/2020    CALCIUM 8 8 02/16/2020    AST 58 02/16/2020    ALT 31 02/16/2020    ALKPHOS 48 02/16/2020    EGFR 103 02/16/2020         Code Status:Full code    Patient Strengths/Assets: communication skills, good past treatment response    Patient Barriers/Limitations: lack of social/family support, noncompliant with medication    Assessment/Plan     Principal Problem:    Paranoid schizophrenia ([facility] )    Plan:   Risks, benefits and possible side effects of Medications:   Risks, benefits, and possible side effects of medications explained to patient and patient verbalizes understanding  Started on Zyprexa 10 mg hs, will increase to 15 mg hs today

## 2020-02-16 NOTE — ASSESSMENT & PLAN NOTE
Patient states that he was tackled by police yesterday and has pain in his sternum and chest   Will do an x-ray PA and lateral view to rule out any rib fractures  EKG reviewed which shows normal sinus rhythm  Labs reviewed  He does have some abnormality of his CMP due to being hemolyzed  Will repeat CK total in the morning along with a CMP

## 2020-02-16 NOTE — NURSING NOTE
Patient is a new admit on 302 commitment d/t delusional, psychotic behavior and medication non-compliance  Patient reports reason for admission as "I shouldn't be here  I'm the One  I'm [employer]  I'm sick of you people not believing me!"  Patient was brought into the ED by the [facility] and is pending criminal charges  Patient angry with the police and insisting that he was doing nothing wrong and just sitting in his car  Patient delusional that he is "God" and "[employer]"  During the admission interview he was preoccupied with reading the bible  He allowed nursing staff to perform the physical assessment, but refused to take his scheduled Zyprexa dose tonight  Patient stating, "I don't need that  I'm not crazy!"  His physical assessment was unremarkable

## 2020-02-16 NOTE — PLAN OF CARE
Problem: PSYCHOSIS  Goal: Will report no hallucinations or delusions  Description  Interventions:  - Administer medication as  ordered  - Every waking shifts and PRN assess for the presence of hallucinations and or delusions  - Assist with reality testing to support increasing orientation  - Assess if patient's hallucinations or delusions are encouraging self-harm or harm to others and intervene as appropriate  Outcome: Not Progressing     Problem: SELF CARE DEFICIT  Goal: Return ADL status to a safe level of function  Description  INTERVENTIONS:  - Administer medication as ordered  - Assess ADL deficits and provide assistive devices as needed  - Obtain PT/OT consults as needed  - Assist and instruct patient to increase activity and self care as tolerated  Outcome: Not Progressing     Problem: Alteration in Thoughts and Perception  Goal: Treatment Goal: Gain control of psychotic behaviors/thinking, reduce/eliminate presenting symptoms and demonstrate improved reality functioning upon discharge  Outcome: Not Progressing  Goal: Verbalize thoughts and feelings  Description  Interventions:  - Promote a nonjudgmental and trusting relationship with the patient through active listening and therapeutic communication  - Assess patient's level of functioning, behavior and potential for risk  - Engage patient in 1 on 1 interactions  - Encourage patient to express fears, feelings, frustrations, and discuss symptoms    - [place] patient to reality, help patient recognize reality-based thinking   - Administer medications as ordered and assess for potential side effects  - Provide the patient education related to the signs and symptoms of the illness and desired effects of prescribed medications  Outcome: Not Progressing  Goal: Refrain from acting on delusional thinking/internal stimuli  Description  Interventions:  - Monitor patient closely, per order   - Utilize least restrictive measures   - Set reasonable limits, give positive feedback for acceptable   - Administer medications as ordered and monitor of potential side effects  Outcome: Not Progressing  Goal: Agree to be compliant with medication regime, as prescribed and report medication side effects  Description  Interventions:  - Offer appropriate PRN medication and supervise ingestion; conduct AIMS, as needed   Outcome: Not Progressing  Goal: Attend and participate in unit activities, including therapeutic, recreational, and educational groups  Description  Interventions:  -Encourage Visitation and family involvement in care  Outcome: Not Progressing  Goal: Recognize dysfunctional thoughts, communicate reality-based thoughts at the time of discharge  Description  Interventions:  - Provide medication and psycho-education to assist patient in compliance and developing insight into his/her illness   Outcome: Not Progressing  Goal: Complete daily ADLs, including personal hygiene independently, as able  Description  Interventions:  - Observe, teach, and assist patient with ADLS  - Monitor and promote a balance of rest/activity, with adequate nutrition and elimination   Outcome: Not Progressing

## 2020-02-16 NOTE — NURSING NOTE
Patient with more than 6 hours of sleep  Patient cooperative with AM blood collection  Patient free of falls   Q7 safety checks maintained

## 2020-02-16 NOTE — CONSULTS
ConsultPhillip Quyen Taylor 1965, 49 y o  male MRN: [id number]    Unit/Bed#: [facility] 290-33 Encounter: [id number]    Primary Care Provider: Zuly Wright DO   Date and time admitted to hospital: 2/15/2020  6:32 PM      Inpatient consult for Medical Clearance for [facility] patient  Consult performed by: Evan Arellano MD  Consult ordered by: Kate Vidal MD          * Paranoid schizophrenia [facility])  Assessment & Plan  Further as per psych  Recommend doing a urine drug screen to rule out drug intoxication    Intercostal pain  Assessment & Plan  Patient states that he was tackled by police yesterday and has pain in his sternum and chest   Will do an x-ray PA and lateral view to rule out any rib fractures  EKG reviewed which shows normal sinus rhythm  Labs reviewed  He does have some abnormality of his CMP due to being hemolyzed  Will repeat CK total in the morning along with a CMP  Marijuana abuse  Assessment & Plan  Counseled on cessation    Tobacco abuse  Assessment & Plan  Counseled on smoking cessation and placed on nicotine replacement therapy    Mixed hyperlipidemia  Assessment & Plan  Check a lipid panel in the morning  Currently he is on no lipid-lowering medications        VTE Prophylaxis: Reason for no pharmacologic prophylaxis Encourage ambulation  / reason for no mechanical VTE prophylaxis Low risk     Recommendations for Discharge:  · None    Counseling / Coordination of Care Time: 1 hour  Greater than 50% of total time spent on patient counseling and coordination of care  Collaboration of Care: Were Recommendations Directly Discussed with Primary Treatment Team? - Yes     History of Present Illness: Richard Frank is a 49 y o  male who is originally admitted to the psychiatry service due to paranoid behavior  We are consulted for medical management  Patient was recently found by police and got into an argument with them and was tackled by police    Was brought to the ER for evaluation for his paranoid behavior  He currently complains of 5 x 10 pain in his sternum region due to being tackled yesterday  Denies any other complaints    Review of Systems:    Review of Systems   Constitutional: Positive for appetite change and fatigue  Negative for chills and fever  HENT: Negative for hearing loss, sore throat and trouble swallowing  Eyes: Negative for photophobia, discharge and visual disturbance  Respiratory: Negative for chest tightness and shortness of breath  Cardiovascular: Positive for chest pain  Negative for palpitations  Gastrointestinal: Negative for abdominal pain, blood in stool and vomiting  Endocrine: Negative for polydipsia and polyuria  Genitourinary: Negative for difficulty urinating, dysuria, flank pain and hematuria  Musculoskeletal: Positive for arthralgias  Negative for back pain and gait problem  Skin: Negative for rash  Allergic/Immunologic: Negative for environmental allergies and food allergies  Neurological: Negative for dizziness, seizures, syncope and headaches  Hematological: Does not bruise/bleed easily  Psychiatric/Behavioral: Positive for behavioral problems  The patient is nervous/anxious  All other systems reviewed and are negative  Past Medical and Surgical History:     Past Medical History:   Diagnosis Date    [facility] disorder ([facility] )     Manic, depressive ([facility] )     Psychiatric disorder     Schizophrenia ([facility] )        No past surgical history on file  Meds/Allergies:    all medications and allergies reviewed    Allergies:    Allergies   Allergen Reactions    Amoxicillin Dizziness    Penicillins        Social History:     Marital Status: Single    Substance Use History:   Social History     Substance and Sexual Activity   Alcohol Use Yes     Social History     Tobacco Use   Smoking Status Unknown If Ever Smoked   Smokeless Tobacco Never Used     Social History     Substance and Sexual Activity   Drug Use Yes    Types: Methamphetamines       Family History:    No family history on file  Patient refuses to tell me anything about his family  Physical Exam:     Vitals:   Blood Pressure: 125/64 (02/16/20 0700)  Pulse: 69 (02/16/20 0700)  Temperature: 98 9 °F (37 2 °C) (02/16/20 0700)  Temp Source: Temporal (02/16/20 0700)  Respirations: 20 (02/16/20 0700)  Height: 6' 1" (185 4 cm) (02/15/20 1850)  Weight - Scale: 104 kg (229 lb 4 5 oz) (02/15/20 1850)  SpO2: 95 % (02/15/20 1850)    Physical Exam   Constitutional: He appears well-developed and well-nourished  HENT:   Head: Normocephalic and atraumatic  Right Ear: External ear normal    Left Ear: External ear normal    Mouth/Throat: Oropharynx is clear and moist    Eyes: Pupils are equal, round, and reactive to light  Conjunctivae and EOM are normal    Neck: Normal range of motion  Neck supple  Cardiovascular: Normal rate, regular rhythm, normal heart sounds and intact distal pulses  Tenderness on palpation of chest wall   Pulmonary/Chest: Effort normal and breath sounds normal    Abdominal: Soft  Bowel sounds are normal  He exhibits no mass  There is no tenderness  There is no rebound and no guarding  Genitourinary:   Genitourinary Comments: deferred   Musculoskeletal: Normal range of motion  Neurological: He is alert  He has normal reflexes  Cranial nerves 2-12 are normal   Oriented to person and place only   Skin: Skin is warm and dry  No rash noted  Psychiatric:   Anxious   Nursing note and vitals reviewed  Additional Data:     Lab Results: I have personally reviewed pertinent reports        Results from last 7 days   Lab Units 02/16/20  0455   WBC Thousand/uL 5 40   HEMOGLOBIN g/dL 15 3   HEMATOCRIT % 44 7   PLATELETS Thousands/uL 205   NEUTROS PCT % 58   LYMPHS PCT % 30   MONOS PCT % 8   EOS PCT % 4     Results from last 7 days   Lab Units 02/16/20  0455   SODIUM mmol/L 135*   POTASSIUM mmol/L 4 1   CHLORIDE mmol/L 106   CO2 mmol/L 15*   BUN mg/dL 12 CREATININE mg/dL 0 78   ANION GAP mmol/L 14   CALCIUM mg/dL 8 8   ALBUMIN g/dL 3 5   TOTAL BILIRUBIN mg/dL 1 10   ALK PHOS U/L 48   ALT U/L 31   AST U/L 58   GLUCOSE RANDOM mg/dL 58*             No results found for: HGBA1C            Imaging: I have personally reviewed pertinent reports  XR chest pa & lateral    (Results Pending)       EKG, Pathology, and Other Studies Reviewed on Admission:   · EKG:  Sinus rhythm    ** Please Note: This note has been constructed using a voice recognition system   **

## 2020-02-17 LAB
ALBUMIN SERPL BCP-MCNC: 3.7 G/DL (ref 3–5.2)
ALP SERPL-CCNC: 49 U/L (ref 43–122)
ALT SERPL W P-5'-P-CCNC: 28 U/L (ref 9–52)
ANION GAP SERPL CALCULATED.3IONS-SCNC: 7 MMOL/L (ref 5–14)
AST SERPL W P-5'-P-CCNC: 61 U/L (ref 17–59)
BILIRUB SERPL-MCNC: 0.6 MG/DL
BUN SERPL-MCNC: 11 MG/DL (ref 5–25)
CALCIUM SERPL-MCNC: 9 MG/DL (ref 8.4–10.2)
CHLORIDE SERPL-SCNC: 107 MMOL/L (ref 97–108)
CK MB SERPL-MCNC: 0.9 NG/ML (ref 0–2.4)
CK MB SERPL-MCNC: <1 % (ref 0–2.5)
CK SERPL-CCNC: 353 U/L (ref 55–170)
CO2 SERPL-SCNC: 25 MMOL/L (ref 22–30)
CREAT SERPL-MCNC: 0.83 MG/DL (ref 0.7–1.5)
GFR SERPL CREATININE-BSD FRML MDRD: 100 ML/MIN/1.73SQ M
GLUCOSE P FAST SERPL-MCNC: 99 MG/DL (ref 70–99)
GLUCOSE SERPL-MCNC: 99 MG/DL (ref 70–99)
POTASSIUM SERPL-SCNC: 3.7 MMOL/L (ref 3.6–5)
PROT SERPL-MCNC: 6.8 G/DL (ref 5.9–8.4)
SODIUM SERPL-SCNC: 139 MMOL/L (ref 137–147)

## 2020-02-17 PROCEDURE — 82550 ASSAY OF CK (CPK): CPT | Performed by: FAMILY MEDICINE

## 2020-02-17 PROCEDURE — 99232 SBSQ HOSP IP/OBS MODERATE 35: CPT | Performed by: NURSE PRACTITIONER

## 2020-02-17 PROCEDURE — 80053 COMPREHEN METABOLIC PANEL: CPT | Performed by: FAMILY MEDICINE

## 2020-02-17 PROCEDURE — 82553 CREATINE MB FRACTION: CPT | Performed by: FAMILY MEDICINE

## 2020-02-17 RX ADMIN — OLANZAPINE 15 MG: 5 TABLET, FILM COATED ORAL at 21:44

## 2020-02-17 NOTE — PROGRESS NOTES
Assumed pt's care at 1900  Pt in bed till this time  Compliant with PO meds  No delusions or Bx noted

## 2020-02-17 NOTE — TREATMENT TEAM
Pt attended 1 group  Pt did not interact when prompted  Pt did sit at a table next to peer  Continue to provide therepuetic group support  02/17/20 1400   Activity/Group Checklist   Group Other (Comment)  (community supports)   Attendance Attended   Attendance Duration (min) 31-45   Interactions Did not interact   Affect/Mood Blunted/flat   Goals Achieved Identified feelings; Identified relapse prevention strategies; Discussed coping strategies; Able to listen to others

## 2020-02-17 NOTE — NURSING NOTE
Patient is isolated to self and does not come out to the unit  Compliant with medications  Patient will not verbalize any of his feelings to nurse  When asked if he is depressed, he shrugs his shoulders  Appetite is good and patient ate 100% of breakfast this morning  Patient did take a shower last evening  Does not attend any groups

## 2020-02-17 NOTE — CASE MANAGEMENT
Briefly met with pt and his focus was asking for me to find phone numbers for anyone in his family so he could let them know he is here  I have called numbers he gave, but the VM did not identify itself, so no message was left  I called [address] but they have no emergency contacts on file  I old records I found a Jovana Mendoza at [phone]   I will give pt this info and try again in the AM     Pt has signed HERBERT's and his IMM Rights

## 2020-02-17 NOTE — PROGRESS NOTES
Progress Note - [address] Brenda Taylor 49 y o  male MRN: [id number]  Unit/Bed#: Hesham Brown 807-34 Encounter: [id number]    The patient was seen for continuing care and reviewed with treatment team     Mental Status Evaluation:    Appearance Marginal/poor hygiene   Behavior calm and Superficial   Mood improving   Speech Sparse   Affect blunted   Thought Processes Unable to assess due to scant verbalization   Thought Content Does not verbalize delusional material   Perceptual Disturbances Denies hallucinations and does not appear to be responding to internal stimuli   Risk Potential Suicidal/Homicidal Ideation - No suicidal or homicidal ideation  Risk of Violence - No  Risk of Self Mutilation - No   Sensorium oriented to person, place, time/date and situation   Cognition/Memory recent and remote memory: unable to assess due to lack of cooperation   Consciousness Lying in bed with eyes closed  Nods appropriately to questions posed but will not engage further with provider   Attention/Concentration attention span and concentration appear shorter than expected for age   Insight Unable to assess   Judgement Unable to assess   Muscle Strength and Gait/Station normal muscle strength and normal muscle tone, normal gait/station and normal balance   Motor Activity no abnormal movements       Progress Toward Goals:  Patient observed lying in bed resting  Patient has eyes closed throughout interview and does not elaborate on any questions posed by provider  Denies all psychiatric symptoms  Reports he is sleeping and eating  Denies voices  Denies suicidal ideation  Refuses to participate  Behavior and disorganization improved since admission  Denies medication side effects Continue with olanzapine 15 mg HS to treat acute psychosis  No medication changes at this time      Recommended Treatment: Continue with pharmacotherapy, group therapy, milieu therapy and occupational therapy    The patient will be maintained on the following medications:    Current Facility-Administered Medications:  haloperidol 2 mg Oral Q8H PRN Darral Sherrie, MD   hydrOXYzine HCL 25 mg Oral Q6H PRN Davidral Sherrie, MD   LORazepam 1 mg Oral Q8H PRN Darral Sherrie, MD   nicotine polacrilex 2 mg Oral Q2H PRN Darral Sherrie, MD   OLANZapine 15 mg Oral HS Davidral Sherrie, MD   OLANZapine 5 mg Oral Q12H PRN Davidral Sherrie, MD   risperiDONE 0 5 mg Oral Q8H PRN Darral Sherrie, MD   traZODone 25 mg Oral HS PRN Davidral Sherrie, MD       Paranoid schizophrenia [facility])

## 2020-02-18 LAB
ATRIAL RATE: 83 BPM
P AXIS: 34 DEGREES
PR INTERVAL: 156 MS
QRS AXIS: -41 DEGREES
QRSD INTERVAL: 102 MS
QT INTERVAL: 402 MS
QTC INTERVAL: 472 MS
T WAVE AXIS: 55 DEGREES
VENTRICULAR RATE: 83 BPM

## 2020-02-18 PROCEDURE — 97167 OT EVAL HIGH COMPLEX 60 MIN: CPT

## 2020-02-18 PROCEDURE — 93010 ELECTROCARDIOGRAM REPORT: CPT | Performed by: INTERNAL MEDICINE

## 2020-02-18 PROCEDURE — 99233 SBSQ HOSP IP/OBS HIGH 50: CPT | Performed by: PSYCHIATRY & NEUROLOGY

## 2020-02-18 RX ORDER — ACETAMINOPHEN 325 MG/1
650 TABLET ORAL EVERY 6 HOURS PRN
Status: DISCONTINUED | OUTPATIENT
Start: 2020-02-18 | End: 2020-03-04 | Stop reason: HOSPADM

## 2020-02-18 RX ORDER — MAGNESIUM HYDROXIDE/ALUMINUM HYDROXICE/SIMETHICONE 120; 1200; 1200 MG/30ML; MG/30ML; MG/30ML
30 SUSPENSION ORAL EVERY 4 HOURS PRN
Status: DISCONTINUED | OUTPATIENT
Start: 2020-02-18 | End: 2020-03-04 | Stop reason: HOSPADM

## 2020-02-18 RX ORDER — OLANZAPINE 10 MG/1
20 TABLET ORAL
Status: DISCONTINUED | OUTPATIENT
Start: 2020-02-18 | End: 2020-02-24

## 2020-02-18 RX ADMIN — OLANZAPINE 20 MG: 10 TABLET, FILM COATED ORAL at 21:21

## 2020-02-18 NOTE — PROGRESS NOTES
02/18/20 1433   Team Meeting   Meeting Type Tx Team Meeting   Initial Conference Date 02/18/20   Next Conference Date 03/18/20   Team Members Present   Team Members Present Physician;Nurse;   Physician Team Member   (Dr Kina Lui)   Nursing Team Member   Qi Albert RN)   Care Management Team Member   Ruthie Roldan OTR/L)   Patient/Family Present   Patient Present Yes   Patient's Family Present No

## 2020-02-18 NOTE — PLAN OF CARE
Problem: PSYCHOSIS  Goal: Will report no hallucinations or delusions  Description  Interventions:  - Administer medication as  ordered  - Every waking shifts and PRN assess for the presence of hallucinations and or delusions  - Assist with reality testing to support increasing orientation  - Assess if patient's hallucinations or delusions are encouraging self-harm or harm to others and intervene as appropriate  Outcome: Progressing     Problem: SELF CARE DEFICIT  Goal: Return ADL status to a safe level of function  Description  INTERVENTIONS:  - Administer medication as ordered  - Assess ADL deficits and provide assistive devices as needed  - Obtain PT/OT consults as needed  - Assist and instruct patient to increase activity and self care as tolerated  Outcome: Progressing     Problem: Alteration in Thoughts and Perception  Goal: Treatment Goal: Gain control of psychotic behaviors/thinking, reduce/eliminate presenting symptoms and demonstrate improved reality functioning upon discharge  Outcome: Progressing  Goal: Verbalize thoughts and feelings  Description  Interventions:  - Promote a nonjudgmental and trusting relationship with the patient through active listening and therapeutic communication  - Assess patient's level of functioning, behavior and potential for risk  - Engage patient in 1 on 1 interactions  - Encourage patient to express fears, feelings, frustrations, and discuss symptoms    - [place] patient to reality, help patient recognize reality-based thinking   - Administer medications as ordered and assess for potential side effects  - Provide the patient education related to the signs and symptoms of the illness and desired effects of prescribed medications  Outcome: Progressing  Goal: Refrain from acting on delusional thinking/internal stimuli  Description  Interventions:  - Monitor patient closely, per order   - Utilize least restrictive measures   - Set reasonable limits, give positive feedback for acceptable   - Administer medications as ordered and monitor of potential side effects  Outcome: Progressing  Goal: Agree to be compliant with medication regime, as prescribed and report medication side effects  Description  Interventions:  - Offer appropriate PRN medication and supervise ingestion; conduct AIMS, as needed   Outcome: Progressing  Goal: Recognize dysfunctional thoughts, communicate reality-based thoughts at the time of discharge  Description  Interventions:  - Provide medication and psycho-education to assist patient in compliance and developing insight into his/her illness   Outcome: Progressing  Goal: Complete daily ADLs, including personal hygiene independently, as able  Description  Interventions:  - Observe, teach, and assist patient with ADLS  - Monitor and promote a balance of rest/activity, with adequate nutrition and elimination   Outcome: Progressing

## 2020-02-18 NOTE — TREATMENT TEAM
02/18/20 0800   Team Meeting   Meeting Type Daily Rounds   Team Members Present   Team Members Present Physician;Nurse;;Occupational Therapist;Other (Discipline and Name)   Physician Team Member [address] [place] [address] [email or web address] Team Member SALLY [facility] CTR Management Team Member Renate North    OT Team Member Amanda Goodwin    Other (Discipline and Name) Irais      Pt discussed at treatment team today,  No medication changes made

## 2020-02-18 NOTE — DISCHARGE INSTR - OTHER ORDERS
Please note     You must report to [facility] office within 48 hours of this discharge, [phone]    [address] [place] [address] Free: [phone]
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
## 2020-02-18 NOTE — CASE MANAGEMENT
Pt has been informed of his 303 rights and requests to be represented by the [facility]  I have faxed the request for 303 to [facility]  [facility] office  She will call back with the schedule date and time for the 303  Dr Sarah Calvillo is supportive of same  Received a call back from Manuel 26- who was able to give us a phone contact number for pt's neighbor- [address] [place] [address] [phone] / [phone]  Pt reports he will call [facility] who will in turn be able to give pt his sisters phone contact
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
## 2020-02-18 NOTE — NURSING NOTE
Up for breakfast,then isolates to his room  Pt denies any issues,very guarded on approach  No social interaction noted,declined groups despite encouragement  Will continue to monitor closely and provide support
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
## 2020-02-18 NOTE — TREATMENT TEAM
Pt did not attend groups when prompted or offered         02/18/20 1100   Activity/Group Checklist   Group Other (Comment)  ( education:stress and anxiety, awareness and management  )   Attendance Did not attend

## 2020-02-19 PROCEDURE — 99232 SBSQ HOSP IP/OBS MODERATE 35: CPT | Performed by: PSYCHIATRY & NEUROLOGY

## 2020-02-19 RX ADMIN — OLANZAPINE 20 MG: 10 TABLET, FILM COATED ORAL at 21:32

## 2020-02-19 NOTE — NURSING NOTE
Patient appeared to be asleep throughout the night with no apparent issues  Will continue to monitor on q 7 minutes checks

## 2020-02-19 NOTE — NURSING NOTE
Patient is present in the dayroom and is out for meals  Pt is medication compliant and cooperative with care  Pt is visible and scant in conversation  Pt is pleasant upon approach  Pt is currently denying all s/s at this time

## 2020-02-19 NOTE — TREATMENT TEAM
02/19/20 1000   Team Meeting   Meeting Type Daily Rounds   Team Members Present   Team Members Present Physician;Nurse;; Other (Discipline and Name)   Physician Team Member [address] Team Member SALLY [facility] CTR Management Team Member Gwendolyn Westbrook    Other (Discipline and Name) Irais      Pt discussed at treatment team today, no medication changes made

## 2020-02-19 NOTE — NURSING NOTE
Pt withdrawn with persecutory, paranoid and grandiose delusions  Good appetite and steady gait  Did not attend group  Monitored for safety and support

## 2020-02-19 NOTE — TREATMENT TEAM
Pt did not attend groups when prompted or offered        02/19/20 1000   Activity/Group Checklist   Group Other (Comment)  (short term problem solving: stages of change )   Attendance Did not attend

## 2020-02-19 NOTE — CASE MANAGEMENT
Received a call from Clara 9 of the [address] [place] [address]  [phone] is asking that we notify that PD when pt is discharged  They have issued a warrant, but are not interested in picking him up at time of DC

## 2020-02-19 NOTE — PLAN OF CARE
Problem: PSYCHOSIS  Goal: Will report no hallucinations or delusions  Description  Interventions:  - Administer medication as  ordered  - Every waking shifts and PRN assess for the presence of hallucinations and or delusions  - Assist with reality testing to support increasing orientation  - Assess if patient's hallucinations or delusions are encouraging self-harm or harm to others and intervene as appropriate  Outcome: Progressing     Problem: SELF CARE DEFICIT  Goal: Return ADL status to a safe level of function  Description  INTERVENTIONS:  - Administer medication as ordered  - Assess ADL deficits and provide assistive devices as needed  - Obtain PT/OT consults as needed  - Assist and instruct patient to increase activity and self care as tolerated  Outcome: Progressing     Problem: Alteration in Thoughts and Perception  Goal: Treatment Goal: Gain control of psychotic behaviors/thinking, reduce/eliminate presenting symptoms and demonstrate improved reality functioning upon discharge  Outcome: Progressing  Goal: Verbalize thoughts and feelings  Description  Interventions:  - Promote a nonjudgmental and trusting relationship with the patient through active listening and therapeutic communication  - Assess patient's level of functioning, behavior and potential for risk  - Engage patient in 1 on 1 interactions  - Encourage patient to express fears, feelings, frustrations, and discuss symptoms    - [place] patient to reality, help patient recognize reality-based thinking   - Administer medications as ordered and assess for potential side effects  - Provide the patient education related to the signs and symptoms of the illness and desired effects of prescribed medications  Outcome: Progressing  Goal: Refrain from acting on delusional thinking/internal stimuli  Description  Interventions:  - Monitor patient closely, per order   - Utilize least restrictive measures   - Set reasonable limits, give positive feedback for acceptable   - Administer medications as ordered and monitor of potential side effects  Outcome: Progressing  Goal: Agree to be compliant with medication regime, as prescribed and report medication side effects  Description  Interventions:  - Offer appropriate PRN medication and supervise ingestion; conduct AIMS, as needed   Outcome: Progressing  Goal: Recognize dysfunctional thoughts, communicate reality-based thoughts at the time of discharge  Description  Interventions:  - Provide medication and psycho-education to assist patient in compliance and developing insight into his/her illness   Outcome: Progressing  Goal: Complete daily ADLs, including personal hygiene independently, as able  Description  Interventions:  - Observe, teach, and assist patient with ADLS  - Monitor and promote a balance of rest/activity, with adequate nutrition and elimination   Outcome: Progressing     Problem: Ineffective Coping  Goal: Participates in unit activities  Description  Interventions:  - Provide therapeutic environment   - Provide required programming   - Redirect inappropriate behaviors   Outcome: Progressing     Problem: Alteration in Thoughts and Perception  Goal: Attend and participate in unit activities, including therapeutic, recreational, and educational groups  Description  Interventions:  -Encourage Visitation and family involvement in care  Outcome: Not Progressing

## 2020-02-19 NOTE — PROGRESS NOTES
Progress Note - [address] Brenda Taylor 49 y o  male MRN: [id number]  Unit/Bed#[facility] 302-31 Encounter: [id number]    The patient was seen for continuing care and reviewed with treatment team   Staff reports the patient remains isolative and delusional   He has been medication compliant  He slept through the night  He has a commitment hearing tomorrow morning  On approach he remains disheveled and malodorous but does have good eye contact  Initially he was scant and repetitive answering all questions with "I'm tired, so tired"  Then he became more talkative and was disorganized and rambling  He remains grandiose and delusional and says there is no doubt in his mind that he is Gavin  He also states that he is a host body for everyone that has   He said that we are all Gods and do not need a government because of this  Remains paranoid thinking that the police are after him  He says that every time he is about to Cheryle Silk out and be as powerful as I am supposed to be" the police stop him  He says that "meth and weed" are the only things that help him with his strength  He does deny hallucinations  He has been eating and sleeping  Current Mental Status Evaluation:  Appearance:  Marginal/poor hygiene, Good eye contact and disheveled   Behavior:  cooperative   Mood:  euthymic   Affect: blunted   Speech: Rambling   Thought Process: Tangential and disorganized   Thought Content:  Paranoid and mistrustful, Delusions of persecution and Grandiose delusions   Perceptual Disturbances: Denies hallucinations and does not appear to be responding to internal stimuli   Risk Potential: No suicidal or homicidal ideation   Orientation:   Oriented x 3     Progress Toward Goals:  No change  Principal Problem:    [facility] ([facility] )  Active Problems:    Mixed hyperlipidemia    Tobacco abuse    Marijuana abuse    Intercostal pain      Recommended Treatment:     Continue Zyprexa 20 mg HS  Continue with pharmacotherapy, group therapy, milieu therapy and occupational therapy    The patient will be maintained on the following medications:    Current Facility-Administered Medications:  acetaminophen 650 mg Oral Q6H PRN NADEEN Soni   aluminum-magnesium hydroxide-simethicone 30 mL Oral Q4H PRN NADEEN Soni   haloperidol 2 mg Oral Q8H PRN Millie Dejesus MD   hydrOXYzine HCL 25 mg Oral Q6H PRN Millie Dejesus MD   LORazepam 1 mg Oral Q8H PRN Millie Dejesus MD   magnesium hydroxide 30 mL Oral Daily PRN NADEEN Soni   nicotine polacrilex 2 mg Oral Q2H PRN Millie Dejesus MD   OLANZapine 20 mg Oral HS NADEEN Soni   OLANZapine 5 mg Oral Q12H PRN Millie Dejesus MD   traZODone 25 mg Oral HS PRN Millie Dejesus MD

## 2020-02-19 NOTE — NURSING NOTE
Pt isolative to room and self  Pleasant upon approach  Pt compliant with meal and medication regime  Pt received a phone call  No behaviors noted  VSS

## 2020-02-20 PROCEDURE — 99233 SBSQ HOSP IP/OBS HIGH 50: CPT | Performed by: PSYCHIATRY & NEUROLOGY

## 2020-02-20 RX ADMIN — OLANZAPINE 20 MG: 10 TABLET, FILM COATED ORAL at 21:21

## 2020-02-20 NOTE — NURSING NOTE
Pt blunted and isolative  Attended group with enc  VSS  Good appetite and steady gait  Denied anxiety, rated depression at "4" (0-10)  Pt delusional, mumbled about the world being in trouble  Monitored for safety and support

## 2020-02-20 NOTE — PLAN OF CARE
Problem: PSYCHOSIS  Goal: Will report no hallucinations or delusions  Description  Interventions:  - Administer medication as  ordered  - Every waking shifts and PRN assess for the presence of hallucinations and or delusions  - Assist with reality testing to support increasing orientation  - Assess if patient's hallucinations or delusions are encouraging self-harm or harm to others and intervene as appropriate  Outcome: Progressing     Problem: SELF CARE DEFICIT  Goal: Return ADL status to a safe level of function  Description  INTERVENTIONS:  - Administer medication as ordered  - Assess ADL deficits and provide assistive devices as needed  - Obtain PT/OT consults as needed  - Assist and instruct patient to increase activity and self care as tolerated  Outcome: Progressing     Problem: Alteration in Thoughts and Perception  Goal: Verbalize thoughts and feelings  Description  Interventions:  - Promote a nonjudgmental and trusting relationship with the patient through active listening and therapeutic communication  - Assess patient's level of functioning, behavior and potential for risk  - Engage patient in 1 on 1 interactions  - Encourage patient to express fears, feelings, frustrations, and discuss symptoms    - [place] patient to reality, help patient recognize reality-based thinking   - Administer medications as ordered and assess for potential side effects  - Provide the patient education related to the signs and symptoms of the illness and desired effects of prescribed medications  Outcome: Progressing  Goal: Refrain from acting on delusional thinking/internal stimuli  Description  Interventions:  - Monitor patient closely, per order   - Utilize least restrictive measures   - Set reasonable limits, give positive feedback for acceptable   - Administer medications as ordered and monitor of potential side effects  Outcome: Progressing  Goal: Agree to be compliant with medication regime, as prescribed and report medication side effects  Description  Interventions:  - Offer appropriate PRN medication and supervise ingestion; conduct AIMS, as needed   Outcome: Progressing  Goal: Recognize dysfunctional thoughts, communicate reality-based thoughts at the time of discharge  Description  Interventions:  - Provide medication and psycho-education to assist patient in compliance and developing insight into his/her illness   Outcome: Progressing  Goal: Complete daily ADLs, including personal hygiene independently, as able  Description  Interventions:  - Observe, teach, and assist patient with ADLS  - Monitor and promote a balance of rest/activity, with adequate nutrition and elimination   Outcome: Progressing     Problem: DISCHARGE PLANNING - CARE MANAGEMENT  Goal: Discharge to post-acute care or home with appropriate resources  Description  INTERVENTIONS:  - Conduct assessment to determine patient/family and health care team treatment goals, and need for post-acute services based on payer coverage, community resources, and patient preferences, and barriers to discharge  - Address psychosocial, clinical, and financial barriers to discharge as identified in assessment in conjunction with the patient/family and health care team  - Arrange appropriate level of post-acute services according to patients   needs and preference and payer coverage in collaboration with the physician and health care team  - Communicate with and update the patient/family, physician, and health care team regarding progress on the discharge plan  - Arrange appropriate transportation to post-acute venues  Outcome: Progressing     Problem: DISCHARGE PLANNING  Goal: Discharge to home or other facility with appropriate resources  Description  INTERVENTIONS:  - Identify barriers to discharge w/patient and caregiver  - Arrange for needed discharge resources and transportation as appropriate  - Identify discharge learning needs (meds, wound care, etc )  - Arrange for interpretive services to assist at discharge as needed  - Refer to Case Management Department for coordinating discharge planning if the patient needs post-hospital services based on physician/advanced practitioner order or complex needs related to functional status, cognitive ability, or social support system  Outcome: Progressing

## 2020-02-20 NOTE — NURSING NOTE
Patient slept all night without any signs of distress noted and he is presently in bed sleeping  Safety checks ongoing

## 2020-02-20 NOTE — TREATMENT TEAM
Pt attended  relapse prevention planning group  Pt blunt affect and positive eye contact when speaking  Pt needed prompting and assistance to complete  relapse prevention plan  Limited insight  Signed and copy in chart  Crisis and warmline phone numbers provided  Continue therepuetic group support  02/20/20 1000   Activity/Group Checklist   Group Other (Comment)  ( relapse prevention planning)   Attendance Attended   Attendance Duration (min) 31-45   Interactions Other (Comment)  (limited responses)   Affect/Mood Incongruent   Goals Achieved Identified feelings; Identified relapse prevention strategies; Able to listen to others; Able to engage in interactions

## 2020-02-20 NOTE — TREATMENT TEAM
02/20/20 0800   Team Meeting   Meeting Type Daily Rounds   Team Members Present   Team Members Present Physician;Nurse;; Other (Discipline and Name); Occupational Therapist   Physician Team Member [address] [place] [address] Sliced Apples Team Member [facility] Management Team Member Keanu London    OT Team Member livia    Other (Discipline and Name) Irais       Pt discussed at treatment team today, no medication changes made

## 2020-02-20 NOTE — CASE MANAGEMENT
Pt attended and participated in his 18 this AM and was represented by Alessia Bergman testified  The [facility]O was Alessia Hill  Pt is now here for up to 20 days, 304 will  3/11/2020  Pt was in agreement to stay for further Tx

## 2020-02-20 NOTE — PROGRESS NOTES
Progress Note - [address] Brenda Taylor 49 y o  male MRN: [id number]  Unit/Bed#: Rosalina Goldstein 706-17 Encounter: [id number]    The patient was seen for continuing care and reviewed with treatment team   Staff reports patient has been paranoid and grandiose  He has been seclusive but came out to group this morning with encouragement  His commitment hearing was also this morning  On approach he states his mood is "all right" and denies depression and anxiety  He said he prefers to stay in his bed here because it has been months since he was able to relax  Reports he was always fighting against police and others who were trying to prevent him from being "who I am"  Remains delusional and states I am God  He said that everywhere he went there were police and other people after him  He is resistant to showering stating that he already did that a few days ago  No SI  Denies hallucinations  Current Mental Status Evaluation:  Appearance:  Good eye contact and disheveled   Behavior:  calm and cooperative   Mood:  euthymic   Affect: blunted   Speech: Normal rate and Normal volume   Thought Process: Tangential   Thought Content:  Paranoid and mistrustful, Delusions of persecution and Grandiose delusions   Perceptual Disturbances: Denies hallucinations and does not appear to be responding to internal stimuli   Risk Potential: No suicidal or homicidal ideation   Orientation:   Oriented x 3     Progress Toward Goals:  Remains delusional but less disorganized  Principal Problem:    Paranoid schizophrenia ([facility] )  Active Problems:    Mixed hyperlipidemia    Tobacco abuse    Marijuana abuse    Intercostal pain      Recommended Treatment:     Continue Zyprexa 20 mg hs  Continue with pharmacotherapy, group therapy, milieu therapy and occupational therapy    The patient will be maintained on the following medications:    Current Facility-Administered Medications:  acetaminophen 650 mg Oral Q6H PRN Christian Heriberto Ingris, NADEEN   aluminum-magnesium hydroxide-simethicone 30 mL Oral Q4H PRN Verlie Solid, CRNP   haloperidol 2 mg Oral Q8H PRN Emir King MD   hydrOXYzine HCL 25 mg Oral Q6H PRN Emir King MD   LORazepam 1 mg Oral Q8H PRN Emir King MD   magnesium hydroxide 30 mL Oral Daily PRN Verlie Solid, CRNP   nicotine polacrilex 2 mg Oral Q2H PRN Emir King MD   OLANZapine 20 mg Oral HS Verlie Solid, CRNP   OLANZapine 5 mg Oral Q12H PRN Emir King MD   traZODone 25 mg Oral HS PRN Emir King MD

## 2020-02-20 NOTE — SOCIAL WORK
Met with pt 1:1 to review pt needs  Pt refused educational information on alcohol dependence and abuse  Attempted to conduct a brief discussion of the dangers of abusing alcohol and provide him with educational material    Pt first stated that he was unable to read  Advised nursing and they were looking into providing reading glasses as he requested  Pt was able to idenitfy his exact prescription  Pt also stated he does not have concerns with co-occuring needs but noted his brother is an alcoholic  Reviewed group schedule and encouraged pt to attend groups  Pt mentioned he is looking forward to pet therapy today and mentioned he has a service dog at home  Pt damon and ramiro historian in statements at this time  Will continue to monitor and provide support and educational materials as requested

## 2020-02-21 PROCEDURE — 99232 SBSQ HOSP IP/OBS MODERATE 35: CPT | Performed by: NURSE PRACTITIONER

## 2020-02-21 RX ADMIN — OLANZAPINE 20 MG: 10 TABLET, FILM COATED ORAL at 21:24

## 2020-02-21 NOTE — NURSING NOTE
Patient is out for meals  Pt is medication compliant and cooperative with care  Pt is seclusive to room and self  Pt is currently denying all s/s at this time  Will continue to monitor

## 2020-02-21 NOTE — TREATMENT TEAM
Pt attended 1 group today  Pt was able to stay for duration but dis not interact when prompted  Pt isolates from peers  Blunt affect  Continue to provide therepuetic group support         02/21/20 1400   Activity/Group Checklist   Group Other (Comment)  (positive reflections)   Attendance Attended   Attendance Duration (min) 31-45   Interactions Did not interact   Affect/Mood Blunted/flat   Goals Achieved Able to listen to others

## 2020-02-21 NOTE — NURSING NOTE
Patient is out for meals  Pt is seclusive to room and self  Pt is medication compliant and cooperative with care  Pt is scant in conversation  Pt reported anxiety rated 4/10  Pt is currently denying all other s/s at this time  Will continue to monitor

## 2020-02-21 NOTE — TREATMENT TEAM
02/21/20 1000   Team Meeting   Meeting Type Daily Rounds   Team Members Present   Team Members Present Physician;Nurse;; Other (Discipline and Name)   Physician Team Member [address] Team Member [facility] Management Team Member oDlores Breen    Other (Discipline and Name) Irais      Pt discussed at treatment team today, no medication changes made

## 2020-02-21 NOTE — PROGRESS NOTES
Progress Note - [address] [place] Brenda Taylor 49 y o  male MRN: [id number]  Unit/Bed#: [facility] 766-92 Encounter: [id number]    The patient was seen for continuing care and reviewed with treatment team   Staff reports the patient has been slightly more visible but isolative to self  He attended one group yesterday but had limited interaction  He remains delusional   He slept through the night  On approach the patient is lying in bed and is disheveled with poor eye contact today  He states that he slept through the night but is still tired  He is feeling a little bit depressed  He says he will try to get up and take a shower today  He remains paranoid thinking that people have always been after him and that his friends keep trying to set him up and turn him into police  Continues with grandiose delusions that he is God and has special duke however he is less spontaneous with these delusions today  Tolerating medications with no side effects  Denies hallucinations  No SI     Current Mental Status Evaluation:  Appearance:  Poor eye contact and disheveled   Behavior:  cooperative   Mood:  depressed   Affect: constricted   Speech: Sparse   Thought Process: Tangential   Thought Content:  Paranoid and mistrustful, Delusions of persecution and Grandiose delusions   Perceptual Disturbances: Denies hallucinations and does not appear to be responding to internal stimuli   Risk Potential: No suicidal or homicidal ideation   Orientation:   Oriented x 3     Progress Toward Goals:  Mild improvement  Principal Problem:    Paranoid schizophrenia ([facility] )  Active Problems:    Mixed hyperlipidemia    Tobacco abuse    Marijuana abuse    Intercostal pain      Recommended Treatment:     Continue Zyprexa 20 mg HS  Continue with pharmacotherapy, group therapy, milieu therapy and occupational therapy    The patient will be maintained on the following medications:    Current Facility-Administered Medications:  acetaminophen 650 mg Oral Q6H PRN Ariane Lathe, CRNP   aluminum-magnesium hydroxide-simethicone 30 mL Oral Q4H PRN Ariane Lathe, CRNP   haloperidol 2 mg Oral Q8H PRN Jose C Perdue, MD   hydrOXYzine HCL 25 mg Oral Q6H PRN Jose C Perdue MD   LORazepam 1 mg Oral Q8H PRN Jose C Perdue, MD   magnesium hydroxide 30 mL Oral Daily PRN Ariane Lathe, RODERICKNP   nicotine polacrilex 2 mg Oral Q2H PRN Jose C Perdue, MD   OLANZapine 20 mg Oral HS Ariane Lathe, RODERICKNP   OLANZapine 5 mg Oral Q12H PRN Jose C Perdue MD   traZODone 25 mg Oral HS PRN Jose C Perdue MD

## 2020-02-21 NOTE — NURSING NOTE
Awake and visible during breakfast then isolates to his room  Pt is very guarded on approach,reported good sleep and improvement in his mood,denies any depression or anxiety  Declined groups despite encouragement  Pt was encouraged to shower,agreed to do it after lunch  Will continue to monitor closely and encourage participation in unit activities

## 2020-02-21 NOTE — PLAN OF CARE
Problem: PSYCHOSIS  Goal: Will report no hallucinations or delusions  Description  Interventions:  - Administer medication as  ordered  - Every waking shifts and PRN assess for the presence of hallucinations and or delusions  - Assist with reality testing to support increasing orientation  - Assess if patient's hallucinations or delusions are encouraging self-harm or harm to others and intervene as appropriate  Outcome: Progressing     Problem: SELF CARE DEFICIT  Goal: Return ADL status to a safe level of function  Description  INTERVENTIONS:  - Administer medication as ordered  - Assess ADL deficits and provide assistive devices as needed  - Obtain PT/OT consults as needed  - Assist and instruct patient to increase activity and self care as tolerated  Outcome: Progressing     Problem: Alteration in Thoughts and Perception  Goal: Treatment Goal: Gain control of psychotic behaviors/thinking, reduce/eliminate presenting symptoms and demonstrate improved reality functioning upon discharge  Outcome: Progressing  Goal: Verbalize thoughts and feelings  Description  Interventions:  - Promote a nonjudgmental and trusting relationship with the patient through active listening and therapeutic communication  - Assess patient's level of functioning, behavior and potential for risk  - Engage patient in 1 on 1 interactions  - Encourage patient to express fears, feelings, frustrations, and discuss symptoms    - [place] patient to reality, help patient recognize reality-based thinking   - Administer medications as ordered and assess for potential side effects  - Provide the patient education related to the signs and symptoms of the illness and desired effects of prescribed medications  Outcome: Progressing  Goal: Refrain from acting on delusional thinking/internal stimuli  Description  Interventions:  - Monitor patient closely, per order   - Utilize least restrictive measures   - Set reasonable limits, give positive feedback for acceptable   - Administer medications as ordered and monitor of potential side effects  Outcome: Progressing  Goal: Agree to be compliant with medication regime, as prescribed and report medication side effects  Description  Interventions:  - Offer appropriate PRN medication and supervise ingestion; conduct AIMS, as needed   Outcome: Progressing  Goal: Attend and participate in unit activities, including therapeutic, recreational, and educational groups  Description  Interventions:  -Encourage Visitation and family involvement in care  Outcome: Progressing  Goal: Recognize dysfunctional thoughts, communicate reality-based thoughts at the time of discharge  Description  Interventions:  - Provide medication and psycho-education to assist patient in compliance and developing insight into his/her illness   Outcome: Progressing  Goal: Complete daily ADLs, including personal hygiene independently, as able  Description  Interventions:  - Observe, teach, and assist patient with ADLS  - Monitor and promote a balance of rest/activity, with adequate nutrition and elimination   Outcome: Progressing     Problem: Ineffective Coping  Goal: Participates in unit activities  Description  Interventions:  - Provide therapeutic environment   - Provide required programming   - Redirect inappropriate behaviors   Outcome: Progressing

## 2020-02-22 RX ADMIN — OLANZAPINE 20 MG: 10 TABLET, FILM COATED ORAL at 21:20

## 2020-02-22 NOTE — PLAN OF CARE
Problem: PSYCHOSIS  Goal: Will report no hallucinations or delusions  Description  Interventions:  - Administer medication as  ordered  - Every waking shifts and PRN assess for the presence of hallucinations and or delusions  - Assist with reality testing to support increasing orientation  - Assess if patient's hallucinations or delusions are encouraging self-harm or harm to others and intervene as appropriate  Outcome: Progressing     Problem: SELF CARE DEFICIT  Goal: Return ADL status to a safe level of function  Description  INTERVENTIONS:  - Administer medication as ordered  - Assess ADL deficits and provide assistive devices as needed  - Obtain PT/OT consults as needed  - Assist and instruct patient to increase activity and self care as tolerated  Outcome: Progressing     Problem: Alteration in Thoughts and Perception  Goal: Treatment Goal: Gain control of psychotic behaviors/thinking, reduce/eliminate presenting symptoms and demonstrate improved reality functioning upon discharge  Outcome: Progressing  Goal: Verbalize thoughts and feelings  Description  Interventions:  - Promote a nonjudgmental and trusting relationship with the patient through active listening and therapeutic communication  - Assess patient's level of functioning, behavior and potential for risk  - Engage patient in 1 on 1 interactions  - Encourage patient to express fears, feelings, frustrations, and discuss symptoms    - [place] patient to reality, help patient recognize reality-based thinking   - Administer medications as ordered and assess for potential side effects  - Provide the patient education related to the signs and symptoms of the illness and desired effects of prescribed medications  Outcome: Progressing  Goal: Refrain from acting on delusional thinking/internal stimuli  Description  Interventions:  - Monitor patient closely, per order   - Utilize least restrictive measures   - Set reasonable limits, give positive feedback for acceptable   - Administer medications as ordered and monitor of potential side effects  Outcome: Progressing  Goal: Agree to be compliant with medication regime, as prescribed and report medication side effects  Description  Interventions:  - Offer appropriate PRN medication and supervise ingestion; conduct AIMS, as needed   Outcome: Progressing  Goal: Attend and participate in unit activities, including therapeutic, recreational, and educational groups  Description  Interventions:  -Encourage Visitation and family involvement in care  Outcome: Progressing  Goal: Recognize dysfunctional thoughts, communicate reality-based thoughts at the time of discharge  Description  Interventions:  - Provide medication and psycho-education to assist patient in compliance and developing insight into his/her illness   Outcome: Progressing  Goal: Complete daily ADLs, including personal hygiene independently, as able  Description  Interventions:  - Observe, teach, and assist patient with ADLS  - Monitor and promote a balance of rest/activity, with adequate nutrition and elimination   Outcome: Progressing     Problem: DISCHARGE PLANNING - CARE MANAGEMENT  Goal: Discharge to post-acute care or home with appropriate resources  Description  INTERVENTIONS:  - Conduct assessment to determine patient/family and health care team treatment goals, and need for post-acute services based on payer coverage, community resources, and patient preferences, and barriers to discharge  - Address psychosocial, clinical, and financial barriers to discharge as identified in assessment in conjunction with the patient/family and health care team  - Arrange appropriate level of post-acute services according to patients   needs and preference and payer coverage in collaboration with the physician and health care team  - Communicate with and update the patient/family, physician, and health care team regarding progress on the discharge plan  - Arrange appropriate transportation to post-acute venues  Outcome: Progressing     Problem: DISCHARGE PLANNING  Goal: Discharge to home or other facility with appropriate resources  Description  INTERVENTIONS:  - Identify barriers to discharge w/patient and caregiver  - Arrange for needed discharge resources and transportation as appropriate  - Identify discharge learning needs (meds, wound care, etc )  - Arrange for interpretive services to assist at discharge as needed  - Refer to Case Management Department for coordinating discharge planning if the patient needs post-hospital services based on physician/advanced practitioner order or complex needs related to functional status, cognitive ability, or social support system  Outcome: Progressing     Problem: Ineffective Coping  Goal: Participates in unit activities  Description  Interventions:  - Provide therapeutic environment   - Provide required programming   - Redirect inappropriate behaviors   Outcome: Progressing

## 2020-02-22 NOTE — PROGRESS NOTES
Psychiatry Progress Note    Subjective: Interval History     The patient is lying in bed this morning  He is guarded during discussion  Patient does have a blunted affect  He has poor eye contact  The patient is denying depression and anxiety  He states he is feeling better now on his medications  However has been compliant with medication and meals  Has been sleeping well at night  Staff states he is isolative to his room but does come out for meals  He attended 1 group activity yesterday but did not interact  He does not engage in conversation with others      Behavior over the last 24 hours:  unchanged  Sleep: normal  Appetite: normal  Medication side effects: No  ROS: no complaints    Current medications:    Current Facility-Administered Medications:     acetaminophen (TYLENOL) tablet 650 mg, 650 mg, Oral, Q6H PRN, Earnest Purdue, CRNP    aluminum-magnesium hydroxide-simethicone (MYLANTA) 200-200-20 mg/5 mL oral suspension 30 mL, 30 mL, Oral, Q4H PRN, Earnest Purdue, CRNP    haloperidol (HALDOL) tablet 2 mg, 2 mg, Oral, Q8H PRN, Isiah Haines MD    hydrOXYzine HCL (ATARAX) tablet 25 mg, 25 mg, Oral, Q6H PRN, Isiah Haines MD    LORazepam (ATIVAN) tablet 1 mg, 1 mg, Oral, Q8H PRN, Isiah Haines MD    magnesium hydroxide (MILK OF MAGNESIA) 400 mg/5 mL oral suspension 30 mL, 30 mL, Oral, Daily PRN, Earnest Purdue, CRNP    nicotine polacrilex (NICORETTE) gum 2 mg, 2 mg, Oral, Q2H PRN, Isiah Haines MD    OLANZapine (ZyPREXA) tablet 20 mg, 20 mg, Oral, HS, Earnest Purdue, CRNP, 20 mg at 02/21/20 2124    OLANZapine (ZyPREXA) tablet 5 mg, 5 mg, Oral, Q12H PRN, Isiah Haines MD    traZODone (DESYREL) tablet 25 mg, 25 mg, Oral, HS PRN, Isiah Haines MD    Current Problem List:    Patient Active Problem List   Diagnosis    [facility]anoid schizophrenia ([facility] [address] )    Mixed hyperlipidemia    Tobacco abuse    Marijuana abuse    Intercostal pain       Problem list reviewed 02/22/20     Objective:     Vital Signs:  Vitals:    02/21/20 0700 02/21/20 1536 02/21/20 2214 02/22/20 0704   BP: 117/65 115/72 100/57 103/53   BP Location: Left arm  Right arm Right arm   Pulse: 62 98 67 63   Resp: 16 16 16 15   Temp: 97 9 °F (36 6 °C) 97 9 °F (36 6 °C) 98 4 °F (36 9 °C) 97 8 °F (36 6 °C)   TempSrc: Temporal Temporal Temporal Temporal   SpO2: 95% 96% 96% 97%   Weight:       Height:             Appearance:  age appropriate and disheveled   Behavior:  guarded   Speech:  soft   Mood:  constricted and depressed   Affect:  constricted   Thought Process:  goal directed   Thought Content:  delusions  persecutory   Perceptual Disturbances: None   Risk Potential: none   Sensorium:  person, place, situation and time   Cognition:  intact   Consciousness:  alert and awake    Attention: attention span and concentration were age appropriate   Intellect: average   Insight:  limited   Judgment: limited      Motor Activity: no abnormal movements       I/O Past 24 hours:  I/O last 3 completed shifts: In: 12 [P O :698]  Out: -   I/O this shift:  In: 660 [P O :660]  Out: -         Labs:  Reviewed 02/22/20    Progress Toward Goals:  unchanged    Assessment / Plan:     Paranoid schizophrenia ([facility] [address] )    Recommended Treatment:      Medication changes:  1) continue current medication regimen    Non-pharmacological treatments  1) Continue with group therapy, milieu therapy and occupational therapy  Safety  1) Safety/communication plan established targeting dynamic risk factors above  2) Risks, benefits, and possible side effects of medications explained to patient and patient verbalizes understanding  Counseling / Coordination of Care    Total floor / unit time spent today 20 minutes  Greater than 50% of total time was spent with the patient and / or family counseling and / or coordination of care  A description of the counseling / coordination of care       Patient's Rights, confidentiality and exceptions to confidentiality, use of automated medical record, [address] [place] [address] staff access to medical record, and consent to treatment reviewed      Samia Monge PA-C

## 2020-02-22 NOTE — NURSING NOTE
Patient is cooperative with staff but isolative to his self and his room  No complaints of pain or discomfort  No aggressive behaviors noted  No medications given this AM because patient does not have any medications scheduled  No new issues noted at this time

## 2020-02-23 RX ADMIN — OLANZAPINE 20 MG: 10 TABLET, FILM COATED ORAL at 21:23

## 2020-02-23 NOTE — PLAN OF CARE
Problem: PSYCHOSIS  Goal: Will report no hallucinations or delusions  Description  Interventions:  - Administer medication as  ordered  - Every waking shifts and PRN assess for the presence of hallucinations and or delusions  - Assist with reality testing to support increasing orientation  - Assess if patient's hallucinations or delusions are encouraging self-harm or harm to others and intervene as appropriate  Outcome: Progressing     Problem: SELF CARE DEFICIT  Goal: Return ADL status to a safe level of function  Description  INTERVENTIONS:  - Administer medication as ordered  - Assess ADL deficits and provide assistive devices as needed  - Obtain PT/OT consults as needed  - Assist and instruct patient to increase activity and self care as tolerated  Outcome: Progressing     Problem: Alteration in Thoughts and Perception  Goal: Treatment Goal: Gain control of psychotic behaviors/thinking, reduce/eliminate presenting symptoms and demonstrate improved reality functioning upon discharge  Outcome: Progressing  Goal: Verbalize thoughts and feelings  Description  Interventions:  - Promote a nonjudgmental and trusting relationship with the patient through active listening and therapeutic communication  - Assess patient's level of functioning, behavior and potential for risk  - Engage patient in 1 on 1 interactions  - Encourage patient to express fears, feelings, frustrations, and discuss symptoms    - [place] patient to reality, help patient recognize reality-based thinking   - Administer medications as ordered and assess for potential side effects  - Provide the patient education related to the signs and symptoms of the illness and desired effects of prescribed medications  Outcome: Progressing  Goal: Refrain from acting on delusional thinking/internal stimuli  Description  Interventions:  - Monitor patient closely, per order   - Utilize least restrictive measures   - Set reasonable limits, give positive feedback for acceptable   - Administer medications as ordered and monitor of potential side effects  Outcome: Progressing  Goal: Agree to be compliant with medication regime, as prescribed and report medication side effects  Description  Interventions:  - Offer appropriate PRN medication and supervise ingestion; conduct AIMS, as needed   Outcome: Progressing  Goal: Attend and participate in unit activities, including therapeutic, recreational, and educational groups  Description  Interventions:  -Encourage Visitation and family involvement in care  Outcome: Progressing  Goal: Recognize dysfunctional thoughts, communicate reality-based thoughts at the time of discharge  Description  Interventions:  - Provide medication and psycho-education to assist patient in compliance and developing insight into his/her illness   Outcome: Progressing  Goal: Complete daily ADLs, including personal hygiene independently, as able  Description  Interventions:  - Observe, teach, and assist patient with ADLS  - Monitor and promote a balance of rest/activity, with adequate nutrition and elimination   Outcome: Progressing     Problem: DISCHARGE PLANNING - CARE MANAGEMENT  Goal: Discharge to post-acute care or home with appropriate resources  Description  INTERVENTIONS:  - Conduct assessment to determine patient/family and health care team treatment goals, and need for post-acute services based on payer coverage, community resources, and patient preferences, and barriers to discharge  - Address psychosocial, clinical, and financial barriers to discharge as identified in assessment in conjunction with the patient/family and health care team  - Arrange appropriate level of post-acute services according to patients   needs and preference and payer coverage in collaboration with the physician and health care team  - Communicate with and update the patient/family, physician, and health care team regarding progress on the discharge plan  - Arrange appropriate transportation to post-acute venues  Outcome: Progressing     Problem: DISCHARGE PLANNING  Goal: Discharge to home or other facility with appropriate resources  Description  INTERVENTIONS:  - Identify barriers to discharge w/patient and caregiver  - Arrange for needed discharge resources and transportation as appropriate  - Identify discharge learning needs (meds, wound care, etc )  - Arrange for interpretive services to assist at discharge as needed  - Refer to Case Management Department for coordinating discharge planning if the patient needs post-hospital services based on physician/advanced practitioner order or complex needs related to functional status, cognitive ability, or social support system  Outcome: Progressing     Problem: Ineffective Coping  Goal: Participates in unit activities  Description  Interventions:  - Provide therapeutic environment   - Provide required programming   - Redirect inappropriate behaviors   Outcome: Progressing

## 2020-02-23 NOTE — NURSING NOTE
Pt blunted and isolative but out for meals with good appetite  VSS  Gait steady  Continues with paranoid/persecutory delusions that his friends are trying to get him in trouble with the police  Monitored for safety and support

## 2020-02-23 NOTE — NURSING NOTE
Patient is isolative to self with no interactions with peers noted  Patient is cooperative with staff  Appetite is good  No medications given between 0146-3611 with none scheduled  No PRN medications needed  Patient is appropriate in his responses to staff and does not appear to be responding to internal stimuli  No new issues noted at this time

## 2020-02-23 NOTE — PROGRESS NOTES
Psychiatry Progress Note    Subjective: Interval History     The patient is resting in bed this morning  He states he did sleep okay but feels very tired this morning  Patient states he is anxious to leave  He rates his depression as a 4/10  Staff states he has been isolative to his room and self  No aggressive behaviors noted  He has been compliant with his medication and meals  He is denying suicidal ideation      Behavior over the last 24 hours:  unchanged  Sleep: normal  Appetite: normal  Medication side effects: No  ROS: no complaints    Current medications:    Current Facility-Administered Medications:     acetaminophen (TYLENOL) tablet 650 mg, 650 mg, Oral, Q6H PRN, Donney Quiet, CRNP    aluminum-magnesium hydroxide-simethicone (MYLANTA) 200-200-20 mg/5 mL oral suspension 30 mL, 30 mL, Oral, Q4H PRN, Donney Quiet, CRNP    haloperidol (HALDOL) tablet 2 mg, 2 mg, Oral, Q8H PRN, Kang Briceno MD    hydrOXYzine HCL (ATARAX) tablet 25 mg, 25 mg, Oral, Q6H PRN, Kang Briceno MD    LORazepam (ATIVAN) tablet 1 mg, 1 mg, Oral, Q8H PRN, Kang Briceno MD    magnesium hydroxide (MILK OF MAGNESIA) 400 mg/5 mL oral suspension 30 mL, 30 mL, Oral, Daily PRN, Donney Quiet, CRNP    nicotine polacrilex (NICORETTE) gum 2 mg, 2 mg, Oral, Q2H PRN, Kang Briceno MD    OLANZapine (ZyPREXA) tablet 20 mg, 20 mg, Oral, HS, Donney Quiet, CRNP, 20 mg at 02/22/20 2120    OLANZapine (ZyPREXA) tablet 5 mg, 5 mg, Oral, Q12H PRN, Kang Briceno MD    traZODone (DESYREL) tablet 25 mg, 25 mg, Oral, HS PRN, Kang Briceno MD    Current Problem List:    Patient Active Problem List   Diagnosis    [facility]an[facility] ([facility] [address] )    Mixed hyperlipidemia    Tobacco abuse    Marijuana abuse    Intercostal pain       Problem list reviewed 02/23/20     Objective:     Vital Signs:  Vitals:    02/22/20 1544 02/22/20 2019 02/22/20 2058 02/23/20 0726   BP: 115/74  110/57 117/67   BP Location: Left arm  Right arm Right arm   Pulse: (!) 116 96 71 87   Resp: 16  15 15   Temp: 97 5 °F (36 4 °C)  98 °F (36 7 °C) 97 7 °F (36 5 °C)   TempSrc: Temporal  Temporal Temporal   SpO2: 97%  98% 97%   Weight:       Height:             Appearance:  age appropriate and disheveled   Behavior:  guarded   Speech:  soft   Mood:  constricted and depressed   Affect:  constricted   Thought Process:  goal directed   Thought Content:  delusions  persecutory   Perceptual Disturbances: None   Risk Potential: none   Sensorium:  person, place, situation and time   Cognition:  intact   Consciousness:  alert and awake    Attention: attention span and concentration were age appropriate   Intellect: average   Insight:  limited   Judgment: limited      Motor Activity: no abnormal movements       I/O Past 24 hours:  I/O last 3 completed shifts: In: 1760 [P O :1760]  Out: -   I/O this shift: In: 480 [P O :480]  Out: -         Labs:  Reviewed 02/23/20    Progress Toward Goals:  unchanged    Assessment / Plan:     [facility]    Recommended Treatment:      Medication changes:  1) continue current medication regimen    Non-pharmacological treatments  1) Continue with group therapy, milieu therapy and occupational therapy  Safety  1) Safety/communication plan established targeting dynamic risk factors above  2) Risks, benefits, and possible side effects of medications explained to patient and patient verbalizes understanding  Counseling / Coordination of Care    Total floor / unit time spent today 20 minutes  Greater than 50% of total time was spent with the patient and / or family counseling and / or coordination of care  A description of the counseling / coordination of care  Patient's Rights, confidentiality and exceptions to confidentiality, use of automated medical record, [facility] dev staff access to medical record, and consent to treatment reviewed      Janey Mederos PA-C

## 2020-02-24 PROCEDURE — 99233 SBSQ HOSP IP/OBS HIGH 50: CPT | Performed by: NURSE PRACTITIONER

## 2020-02-24 RX ADMIN — LORAZEPAM 1 MG: 1 TABLET ORAL at 21:29

## 2020-02-24 RX ADMIN — OLANZAPINE 25 MG: 5 TABLET, FILM COATED ORAL at 21:23

## 2020-02-24 RX ADMIN — ACETAMINOPHEN 650 MG: 325 TABLET ORAL at 21:28

## 2020-02-24 NOTE — TREATMENT TEAM
Pt attended all groups today  Pt expressed interest in speaking with a   02/24/20 1400   Activity/Group Checklist   Group Other (Comment)  (community supports)   Attendance Attended   Attendance Duration (min) 31-45   Interactions Disorganized interaction   Affect/Mood Blunted/flat   Goals Achieved Identified feelings; Identified relapse prevention strategies; Discussed coping strategies; Identified resources and support systems; Able to listen to others; Able to engage in interactions

## 2020-02-24 NOTE — PROGRESS NOTES
Progress Note - [address] [place] Brenda Taylor 49 y o  male MRN: [id number]  Unit/Bed#: [facility] [place] [address] Encounter: [id number]    The patient was seen for continuing care and reviewed with treatment team   Staff reports the patient has been isolative to his room and less spontaneous with his delusions  He has been sleeping at night  On approach the patient is cooperative and pleasant  He is better groomed as he took a shower, shaved and had his hair cut over the weekend  He remains paranoid, delusional and religiously preoccupied  He still believes he is God and that he can link into people's mind to communicate with them  He says he knows medications are supposed to change that belief but I still know who I am"  He says he is here trying to save everyone on earth  He remains paranoid thinking that his  at [facility] poisoned him because "I am the living God"  Continues to think police and various other people are after him  He is expressing passive suicidal thoughts now  He says he has lost everything and that none of his family wants anything to do with him  He says the only thing he has to live for is his dog  He became tearful several times during the interview  Current Mental Status Evaluation:  Appearance:  Adequate hygiene and grooming and Good eye contact   Behavior:  cooperative   Mood:  dysphoric   Affect: Tearful   Speech: Hyperverbal   Thought Process:   Tangential   Thought Content:  Paranoid and mistrustful, Delusions of persecution, Grandi[place] delusions and Religiously preoccupied   Perceptual Disturbances: Denies hallucinations and does not appear to be responding to internal stimuli   Risk Potential: Hopeless with death wishes   Orientation:   Oriented x 3     Progress Toward Goals:  Very mild improvement  Principal Problem:    [facility]anoid schizophrenia ([facility] )  Active Problems:    Mixed hyperlipidemia    Tobacco abuse    Marijuana abuse    Intercostal pain      Recommended Treatment:     Increase Zyprexa to 25 mg HS     Continue with pharmacotherapy, group therapy, milieu therapy and occupational therapy    The patient will be maintained on the following medications:    Current Facility-Administered Medications:  acetaminophen 650 mg Oral Q6H PRN Jesus NADEEN Santana   aluminum-magnesium hydroxide-simethicone 30 mL Oral Q4H PRN NADEEN Berger   haloperidol 2 mg Oral Q8H PRN Shen Safer, MD   hydrOXYzine HCL 25 mg Oral Q6H PRN Shen Safer, MD   LORazepam 1 mg Oral Q8H PRN Shen Safer, MD   magnesium hydroxide 30 mL Oral Daily PRN NADEEN Berger   nicotine polacrilex 2 mg Oral Q2H PRN Shen Safer, MD   OLANZapine 20 mg Oral HS JesusNADEEN Proctor   OLANZapine 5 mg Oral Q12H PRN Shen Safer, MD   traZODone 25 mg Oral HS PRN Shen Safer, MD

## 2020-02-24 NOTE — TREATMENT TEAM
02/24/20 1000   Team Meeting   Meeting Type Daily Rounds   Team Members Present   Team Members Present Physician;Nurse;; Other (Discipline and Name)   Physician Team Member [address] [place] [employer] Team Member [facility] Management Team Member Joce Hernandez    Other (Discipline and Name) Sandrita Johnson      Pt discussed in treatment team today, no medication changes made

## 2020-02-24 NOTE — TREATMENT TEAM
Pt attended  open discussion group  Pt sat by self and blunt affect  Pt stayed for duration and minimal interaction when prompted  Pt did note that he took care of shaving needs/grooming  Continue to provide therepuetic group support  02/24/20 1000   Activity/Group Checklist   Group Other (Comment)  ( open discussion)   Attendance Attended   Attendance Duration (min) 31-45   Interactions Other (Comment)  (minimal interaction)   Affect/Mood Blunted/flat   Goals Achieved Identified relapse prevention strategies; Discussed coping strategies; Able to listen to others

## 2020-02-24 NOTE — NURSING NOTE
Patient isolative to self  cooperative with staff  Denies hallucinations this am  Blunted affect   Paranoid that his friends are trying to get him in trouble with the police   More hygiene  aware today  Attended group quietly  Will continue to monitor

## 2020-02-25 PROCEDURE — 99232 SBSQ HOSP IP/OBS MODERATE 35: CPT | Performed by: PSYCHIATRY & NEUROLOGY

## 2020-02-25 RX ADMIN — OLANZAPINE 25 MG: 5 TABLET, FILM COATED ORAL at 21:28

## 2020-02-25 NOTE — TREATMENT TEAM
Pt attended 1 group  Guarded and needed prompting  02/25/20 1400   Activity/Group Checklist   Group Other (Comment)  (positive coping: strengths)   Attendance Attended   Attendance Duration (min) 31-45   Interactions Interacted appropriately   Affect/Mood Blunted/flat   Goals Achieved Identified feelings; Identified relapse prevention strategies; Discussed coping strategies; Able to listen to others

## 2020-02-25 NOTE — PLAN OF CARE
Problem: PSYCHOSIS  Goal: Will report no hallucinations or delusions  Description  Interventions:  - Administer medication as  ordered  - Every waking shifts and PRN assess for the presence of hallucinations and or delusions  - Assist with reality testing to support increasing orientation  - Assess if patient's hallucinations or delusions are encouraging self-harm or harm to others and intervene as appropriate  Outcome: Progressing     Problem: SELF CARE DEFICIT  Goal: Return ADL status to a safe level of function  Description  INTERVENTIONS:  - Administer medication as ordered  - Assess ADL deficits and provide assistive devices as needed  - Obtain PT/OT consults as needed  - Assist and instruct patient to increase activity and self care as tolerated  Outcome: Progressing     Problem: Alteration in Thoughts and Perception  Goal: Treatment Goal: Gain control of psychotic behaviors/thinking, reduce/eliminate presenting symptoms and demonstrate improved reality functioning upon discharge  Outcome: Progressing  Goal: Verbalize thoughts and feelings  Description  Interventions:  - Promote a nonjudgmental and trusting relationship with the patient through active listening and therapeutic communication  - Assess patient's level of functioning, behavior and potential for risk  - Engage patient in 1 on 1 interactions  - Encourage patient to express fears, feelings, frustrations, and discuss symptoms    - [place] patient to reality, help patient recognize reality-based thinking   - Administer medications as ordered and assess for potential side effects  - Provide the patient education related to the signs and symptoms of the illness and desired effects of prescribed medications  Outcome: Progressing  Goal: Refrain from acting on delusional thinking/internal stimuli  Description  Interventions:  - Monitor patient closely, per order   - Utilize least restrictive measures   - Set reasonable limits, give positive feedback for acceptable   - Administer medications as ordered and monitor of potential side effects  Outcome: Progressing  Goal: Agree to be compliant with medication regime, as prescribed and report medication side effects  Description  Interventions:  - Offer appropriate PRN medication and supervise ingestion; conduct AIMS, as needed   Outcome: Progressing  Goal: Attend and participate in unit activities, including therapeutic, recreational, and educational groups  Description  Interventions:  -Encourage Visitation and family involvement in care  Outcome: Progressing  Goal: Recognize dysfunctional thoughts, communicate reality-based thoughts at the time of discharge  Description  Interventions:  - Provide medication and psycho-education to assist patient in compliance and developing insight into his/her illness   Outcome: Progressing  Goal: Complete daily ADLs, including personal hygiene independently, as able  Description  Interventions:  - Observe, teach, and assist patient with ADLS  - Monitor and promote a balance of rest/activity, with adequate nutrition and elimination   Outcome: Progressing     Problem: DISCHARGE PLANNING - CARE MANAGEMENT  Goal: Discharge to post-acute care or home with appropriate resources  Description  INTERVENTIONS:  - Conduct assessment to determine patient/family and health care team treatment goals, and need for post-acute services based on payer coverage, community resources, and patient preferences, and barriers to discharge  - Address psychosocial, clinical, and financial barriers to discharge as identified in assessment in conjunction with the patient/family and health care team  - Arrange appropriate level of post-acute services according to patients   needs and preference and payer coverage in collaboration with the physician and health care team  - Communicate with and update the patient/family, physician, and health care team regarding progress on the discharge plan  - Arrange appropriate transportation to post-acute venues  Outcome: Progressing     Problem: DISCHARGE PLANNING  Goal: Discharge to home or other facility with appropriate resources  Description  INTERVENTIONS:  - Identify barriers to discharge w/patient and caregiver  - Arrange for needed discharge resources and transportation as appropriate  - Identify discharge learning needs (meds, wound care, etc )  - Arrange for interpretive services to assist at discharge as needed  - Refer to Case Management Department for coordinating discharge planning if the patient needs post-hospital services based on physician/advanced practitioner order or complex needs related to functional status, cognitive ability, or social support system  Outcome: Progressing     Problem: Ineffective Coping  Goal: Participates in unit activities  Description  Interventions:  - Provide therapeutic environment   - Provide required programming   - Redirect inappropriate behaviors   Outcome: Progressing

## 2020-02-25 NOTE — PROGRESS NOTES
Progress Note - [address] Brenda Tyalor 49 y o  male MRN: [id number]  Unit/Bed#: Rosalina Goldstein 642-58 Encounter: [id number]    The patient was seen for continuing care and reviewed with treatment team   He was withdrawn to his room and was cooperative and calm  He is worried about his dog and also the charges that might be brought against him  Did not verbalize delusional material   His hygiene is better  Sleep and appetite have improved as well    Current Mental Status Evaluation:  Appearance:  Adequate hygiene and grooming and Good eye contact   Behavior:  calm and cooperative   Mood:  anxious   Affect: blunted and constricted   Speech: Normal rate and Normal volume   Thought Process:  Goal directed and coherent   Thought Content:  Does not verbalize delusional material   Perceptual Disturbances: Denies hallucinations and does not appear to be responding to internal stimuli   Risk Potential: No suicidal or homicidal ideation   Orientation:        Progress Toward Goals:  Slowly improving  Principal Problem:    Paranoid schizophrenia ([address] )  Active Problems:    Mixed hyperlipidemia    Tobacco abuse    Marijuana abuse    Intercostal pain      Recommended Treatment:  No side effects reported and he is currently on olanzapine 25 mg at bedtime  We will continue with the same dose Continue with pharmacotherapy, group therapy, milieu therapy and occupational therapy    The patient will be maintained on the following medications:    Current Facility-Administered Medications:  acetaminophen 650 mg Oral Q6H PRN NADEEN Soni   aluminum-magnesium hydroxide-simethicone 30 mL Oral Q4H PRN NADEEN Soni   haloperidol 2 mg Oral Q8H PRN Millie Dejesus MD   hydrOXYzine HCL 25 mg Oral Q6H PRN Millie Dejesus MD   LORazepam 1 mg Oral Q8H PRN Millie Dejesus MD   magnesium hydroxide 30 mL Oral Daily PRN NADEEN Soni   nicotine polacrilex 2 mg Oral Q2H PRN Millie Dejesus MD   OLANZapine 25 mg Oral HS Christian Louis NADEEN Amado   OLANZapine 5 mg Oral Q12H PRN [place] MD Jose   traZODone 25 mg Oral HS PRN [place] MD Jose

## 2020-02-25 NOTE — PLAN OF CARE
Problem: PSYCHOSIS  Goal: Will report no hallucinations or delusions  Description  Interventions:  - Administer medication as  ordered  - Every waking shifts and PRN assess for the presence of hallucinations and or delusions  - Assist with reality testing to support increasing orientation  - Assess if patient's hallucinations or delusions are encouraging self-harm or harm to others and intervene as appropriate  Outcome: Progressing     Problem: SELF CARE DEFICIT  Goal: Return ADL status to a safe level of function  Description  INTERVENTIONS:  - Administer medication as ordered  - Assess ADL deficits and provide assistive devices as needed  - Obtain PT/OT consults as needed  - Assist and instruct patient to increase activity and self care as tolerated  Outcome: Progressing     Problem: Alteration in Thoughts and Perception  Goal: Treatment Goal: Gain control of psychotic behaviors/thinking, reduce/eliminate presenting symptoms and demonstrate improved reality functioning upon discharge  Outcome: Progressing  Goal: Verbalize thoughts and feelings  Description  Interventions:  - Promote a nonjudgmental and trusting relationship with the patient through active listening and therapeutic communication  - Assess patient's level of functioning, behavior and potential for risk  - Engage patient in 1 on 1 interactions  - Encourage patient to express fears, feelings, frustrations, and discuss symptoms    - [place] patient to reality, help patient recognize reality-based thinking   - Administer medications as ordered and assess for potential side effects  - Provide the patient education related to the signs and symptoms of the illness and desired effects of prescribed medications  Outcome: Progressing  Goal: Refrain from acting on delusional thinking/internal stimuli  Description  Interventions:  - Monitor patient closely, per order   - Utilize least restrictive measures   - Set reasonable limits, give positive feedback for acceptable   - Administer medications as ordered and monitor of potential side effects  Outcome: Progressing  Goal: Agree to be compliant with medication regime, as prescribed and report medication side effects  Description  Interventions:  - Offer appropriate PRN medication and supervise ingestion; conduct AIMS, as needed   Outcome: Progressing  Goal: Recognize dysfunctional thoughts, communicate reality-based thoughts at the time of discharge  Description  Interventions:  - Provide medication and psycho-education to assist patient in compliance and developing insight into his/her illness   Outcome: Progressing  Goal: Complete daily ADLs, including personal hygiene independently, as able  Description  Interventions:  - Observe, teach, and assist patient with ADLS  - Monitor and promote a balance of rest/activity, with adequate nutrition and elimination   Outcome: Progressing

## 2020-02-25 NOTE — TREATMENT TEAM
02/25/20 0800   Team Meeting   Meeting Type Daily Rounds   Team Members Present   Team Members Present Physician;Nurse;; Other (Discipline and Name); Occupational Therapist   Physician Team Member [id number] [place] Team Member [facility] Management Team Member Lamin Doty    OT Team Member Rosa M Adrian    Other (Discipline and Name) Irais      Pt discussed at treatment team today, no medication changes made

## 2020-02-25 NOTE — NURSING NOTE
Patient was isolative to his bed all evening  During med pass, patient reported feeling a pain throughout his chest that spread to his shoulders  He stated this pain/ache was similar to what he felt the night he was admitted to the hospital  Upon assessment it was noted that the patient's legs were very tremulous  Patient rated his anxiety a 3 5 on a 1 to 4 scale  He denied depression, hallucinations, and suicidal/homicidal thoughts  Patient was given 1 mg PRN Ativan and 650 mg PRN Tylenol at 2128 for the chest pain/anxiety  Patient was cooperative with taking his other evening medication  Patient was observed to be sleeping upon reassessment at 2200  Safety plan was reviewed with the patient and staff availability was reviewed with the patient

## 2020-02-25 NOTE — NURSING NOTE
Pt quiet and blunted but pleasant  Continues with paranoia, persecutory and grandiose delusions  VSS  Good appetite and steady gait  Monitored for safety and support

## 2020-02-26 PROCEDURE — 99232 SBSQ HOSP IP/OBS MODERATE 35: CPT | Performed by: NURSE PRACTITIONER

## 2020-02-26 RX ADMIN — OLANZAPINE 25 MG: 5 TABLET, FILM COATED ORAL at 21:31

## 2020-02-26 RX ADMIN — NICOTINE POLACRILEX 2 MG: 4 GUM, CHEWING ORAL at 15:19

## 2020-02-26 NOTE — NURSING NOTE
Pt blunted but pleasant on approach  Stated he feels safe here  Pt pressured when asked about suspicious thoughts  Speech mumbled  Stated people are out to get him, report him to the police  Denied SI  No agitation noted  Pacing in [place] at times with minimal peer interactions  Good appetite and steady gait  VSS  Monitored for safety and support

## 2020-02-26 NOTE — NURSING NOTE
Pt isolative to self and room this evening  Pt reports anxiety 3/4  Pt reports ambulating helps  Pt paranoid about what may happen to him when discharged  No [employer] preoccupation notified this evening  Pt compliant with meal and medication regime  VSS

## 2020-02-26 NOTE — TREATMENT TEAM
02/26/20 1500   Team Members Present   Team Members Present Physician;Nurse;; Other (Discipline and Name)   Physician Team Member [address] [place] Team Member [facility] Management Team Member Rebecca Levin    Other (Discipline and Name) Irais      Pt discussed at treatment team today, no medication changes made What Type Of Note Output Would You Prefer (Optional)?: Standard Output How Severe Is Your Rash?: moderate Is This A New Presentation, Or A Follow-Up?: Rash Additional History: Happens only when it’s warm

## 2020-02-26 NOTE — TREATMENT TEAM
Pt attended 1 group  Pt calm and able to self reflect  Pt noted he was not able to read written materials  Read information to pt individually  Pt noted that his values get him in trouble and expressed suspicion of sharing them in his problem solving process  Pt sat by self and cooperative  Continue to provide therepuetic group support  02/26/20 1400   Activity/Group Checklist   Group Other (Comment)  (communication: values and self expression )   Attendance Attended   Attendance Duration (min) 31-45   Interactions Interacted appropriately   Affect/Mood Appropriate   Goals Achieved Identified feelings; Discussed coping strategies; Able to listen to others; Able to engage in interactions; Able to reflect/comment on own behavior;Able to manage/cope with feelings;Verbalized increased hopefulness; Able to self-disclose; Able to recieve feedback

## 2020-02-26 NOTE — NURSING NOTE
Pt requesting a nicotine patch  PRN Nicorette gum given at 1519 and effective  Pt isolative to room  Pt noted resting  Pt compliant with meal and medication regime  Pt denies SI/HI  Pt reports anxiety but no depression  VSS

## 2020-02-26 NOTE — PLAN OF CARE
Problem: PSYCHOSIS  Goal: Will report no hallucinations or delusions  Description  Interventions:  - Administer medication as  ordered  - Every waking shifts and PRN assess for the presence of hallucinations and or delusions  - Assist with reality testing to support increasing orientation  - Assess if patient's hallucinations or delusions are encouraging self-harm or harm to others and intervene as appropriate  Outcome: Progressing     Problem: SELF CARE DEFICIT  Goal: Return ADL status to a safe level of function  Description  INTERVENTIONS:  - Administer medication as ordered  - Assess ADL deficits and provide assistive devices as needed  - Obtain PT/OT consults as needed  - Assist and instruct patient to increase activity and self care as tolerated  Outcome: Progressing     Problem: Alteration in Thoughts and Perception  Goal: Treatment Goal: Gain control of psychotic behaviors/thinking, reduce/eliminate presenting symptoms and demonstrate improved reality functioning upon discharge  Outcome: Progressing  Goal: Verbalize thoughts and feelings  Description  Interventions:  - Promote a nonjudgmental and trusting relationship with the patient through active listening and therapeutic communication  - Assess patient's level of functioning, behavior and potential for risk  - Engage patient in 1 on 1 interactions  - Encourage patient to express fears, feelings, frustrations, and discuss symptoms    - [place] patient to reality, help patient recognize reality-based thinking   - Administer medications as ordered and assess for potential side effects  - Provide the patient education related to the signs and symptoms of the illness and desired effects of prescribed medications  Outcome: Progressing  Goal: Refrain from acting on delusional thinking/internal stimuli  Description  Interventions:  - Monitor patient closely, per order   - Utilize least restrictive measures   - Set reasonable limits, give positive feedback for acceptable   - Administer medications as ordered and monitor of potential side effects  Outcome: Progressing  Goal: Agree to be compliant with medication regime, as prescribed and report medication side effects  Description  Interventions:  - Offer appropriate PRN medication and supervise ingestion; conduct AIMS, as needed   Outcome: Progressing  Goal: Recognize dysfunctional thoughts, communicate reality-based thoughts at the time of discharge  Description  Interventions:  - Provide medication and psycho-education to assist patient in compliance and developing insight into his/her illness   Outcome: Progressing  Goal: Complete daily ADLs, including personal hygiene independently, as able  Description  Interventions:  - Observe, teach, and assist patient with ADLS  - Monitor and promote a balance of rest/activity, with adequate nutrition and elimination   Outcome: Progressing     Problem: DISCHARGE PLANNING  Goal: Discharge to home or other facility with appropriate resources  Description  INTERVENTIONS:  - Identify barriers to discharge w/patient and caregiver  - Arrange for needed discharge resources and transportation as appropriate  - Identify discharge learning needs (meds, wound care, etc )  - Arrange for interpretive services to assist at discharge as needed  - Refer to Case Management Department for coordinating discharge planning if the patient needs post-hospital services based on physician/advanced practitioner order or complex needs related to functional status, cognitive ability, or social support system  Outcome: Progressing

## 2020-02-27 PROCEDURE — 99233 SBSQ HOSP IP/OBS HIGH 50: CPT | Performed by: NURSE PRACTITIONER

## 2020-02-27 RX ORDER — NICOTINE 21 MG/24HR
1 PATCH, TRANSDERMAL 24 HOURS TRANSDERMAL DAILY
Status: DISCONTINUED | OUTPATIENT
Start: 2020-02-27 | End: 2020-03-04 | Stop reason: HOSPADM

## 2020-02-27 RX ADMIN — NICOTINE 1 PATCH: 14 PATCH, EXTENDED RELEASE TRANSDERMAL at 11:00

## 2020-02-27 RX ADMIN — TRAZODONE HYDROCHLORIDE 25 MG: 50 TABLET ORAL at 22:04

## 2020-02-27 RX ADMIN — OLANZAPINE 25 MG: 10 TABLET, ORALLY DISINTEGRATING ORAL at 21:56

## 2020-02-27 NOTE — CASE MANAGEMENT
Pt is much more visible on the unit, but still very isolative to himself  He walks the halls often and will cordially greet, but not intitiate conversation  He continues paranoid and suspicious

## 2020-02-27 NOTE — NURSING NOTE
Pt visible in the community  Often walking in the [place]  Reported depression 8/10 and anxiety is 3/4  Pt stated, "I am here because I read the Bible  Everytime I do I end up in the hospital"  Proceeded to state that he believes that he will be "going to intermediate" because prior to coming in, he had meth and cocaine on him and will need to "go to intermediate for that" when discharged  Medication compliant  Will continue to monitor and maintain q 7 min checks

## 2020-02-27 NOTE — TREATMENT TEAM
02/27/20 0800   Team Meeting   Meeting Type Daily Rounds   Team Members Present   Team Members Present Physician;Nurse;; Other (Discipline and Name)   Physician Team Member Lukasz    Nursing Team Member [facility] Team Member Landry Patel    Other (Discipline and Name) Irais      Pt discussed at treatment team, no medication changes made

## 2020-02-27 NOTE — TREATMENT TEAM
Pt did not attend group when prompted or offered         02/27/20 1000   Activity/Group Checklist   Group Other (Comment)  ( relapse prevention planning)   Attendance Did not attend

## 2020-02-27 NOTE — PROGRESS NOTES
Progress Note - [address] [facility] [address] [facility] 49 y o  male MRN: [id number]  Unit/Bed#: Chandler Santana 801-50 Encounter: [id number]    The patient was seen for continuing care and reviewed with treatment team   The patient has been paranoid and anxious  He is visible at times but isolative to self  He slept through the night  He did attend one group and was able to interact appropriately although he sits on the periphery and is somewhat suspicious of others  He remains delusional and paranoid and states he is anxious and depressed  He believes that he is God and he would like to take a lie detector to prove that he is God  He says that he has saved the world and yet nobody thanks him  Instead they send him to [facility] or the hospital   He is worried about what will happen to him after discharge  He is fixated on his truck, stating that he left it in a graveyard full bodies in nobody cares so they are not checking  He requested a nicotine patch because he says he smokes a pack daily  He claims he has always been taking his medication as directed prior to admission and it has never changed his views that he is God  Sleep and appetite have been normal   He is feeling more tired from the medications but no other side effects reported  Denies SI  Current Mental Status Evaluation:  Appearance:  Good eye contact   Behavior:  cooperative   Mood:  angry, anxious and depressed   Affect: constricted   Speech: Pressured   Thought Process:   Tangential   Thought Content:  Paranoid and mistrustful and Grandiose delusions   Perceptual Disturbances: Denies hallucinations and does not appear to be responding to internal stimuli   Risk Potential: No suicidal or homicidal ideation   Orientation:   Oriented x 3     Progress Toward Goals:  No change  Principal Problem:    [facility] ([facility] )  Active Problems:    Mixed hyperlipidemia    Tobacco abuse    Marijuana abuse    Intercostal pain      Recommended Treatment: Will switch to Zydis for better medication compliance  Continue 25 mg HS  Continue with pharmacotherapy, group therapy, milieu therapy and occupational therapy    The patient will be maintained on the following medications:    Current Facility-Administered Medications:  acetaminophen 650 mg Oral Q6H PRN NADEEN Guerra   aluminum-magnesium hydroxide-simethicone 30 mL Oral Q4H PRN NADEEN Guerra   haloperidol 2 mg Oral Q8H PRN Boo Faster, MD   hydrOXYzine HCL 25 mg Oral Q6H PRN Boo Faster, MD   LORazepam 1 mg Oral Q8H PRN Boo Faster, MD   magnesium hydroxide 30 mL Oral Daily PRN NADEEN Guerra   nicotine polacrilex 2 mg Oral Q2H PRN Boo Faster, MD   OLANZapine 25 mg Oral HS NADEEN Guerra   OLANZapine 5 mg Oral Q12H PRN Boo Faster, MD   traZODone 25 mg Oral HS PRN Boo Faster, MD

## 2020-02-27 NOTE — NURSING NOTE
Patient has been in bed asleep through the night  No concerns expressed, no apparent distress noted  Labs, orders and vital signs have been reviewed  On routine checks, will continue to monitor

## 2020-02-27 NOTE — NURSING NOTE
Pt blunted but pleasant and med-compliant with good appetite and steady gait  VSS  Pt isolative at times but is currently attending group  Continues with suspiciousness, persecutory and grandiose delusions  No agitation noted    Monitored for safety and support

## 2020-02-27 NOTE — TREATMENT TEAM
Attentive/Able to talk about himself/Participated      02/27/20 1400   Activity/Group Checklist   Group   (Recovery and Relapse )   Attendance Attended   Attendance Duration (min) 46-60   Interactions Interacted appropriately   Affect/Mood Appropriate;Normal range   Goals Achieved Identified feelings; Able to engage in interactions; Able to listen to others; Able to self-disclose

## 2020-02-28 PROCEDURE — 99232 SBSQ HOSP IP/OBS MODERATE 35: CPT | Performed by: NURSE PRACTITIONER

## 2020-02-28 RX ADMIN — NICOTINE 1 PATCH: 14 PATCH, EXTENDED RELEASE TRANSDERMAL at 08:39

## 2020-02-28 RX ADMIN — OLANZAPINE 25 MG: 10 TABLET, ORALLY DISINTEGRATING ORAL at 21:28

## 2020-02-28 NOTE — NURSING NOTE
Pt was pleasant upon approach, medication compliant, stated depression is 4/10, anxiety is 3/4, no SI/HI, no voices, no pain, feels safe here  Given PRN trazodone for sleep at 2204  Will monitor

## 2020-02-28 NOTE — PROGRESS NOTES
Progress Note - [address] Brenda Taylor 49 y o  male MRN: [id number]  Unit/Bed#: [facility] 069-16 Encounter: [id number]    The patient was seen for continuing care and reviewed with treatment team  Staff reports the patient remains delusional, paranoid, and thinking he is God  He was religiously preoccupied yesterday  He has been depressed as well as very anxious  He has not been attending groups  Yesterday his Zyprexa was switched to dispersible tabs as medication compliance was questionable due to severity of psychosis  On approach he is very tired today and lying in his bed  He says his mood is "alright" but he does admit he is quite anxious  Very scant responses today  He says he did not sleep last night  Appetite has been normal   No SI     Current Mental Status Evaluation:  Appearance:  disheveled, intermittent eye contact   Behavior:  cooperative   Mood:  anxious and depressed   Affect: constricted   Speech: Sparse   Thought Process:  Goal directed and coherent   Thought Content:  Paranoid and mistrustful, Delusions of persecution and Grandiose delusions   Perceptual Disturbances: Denies hallucinations and does not appear to be responding to internal stimuli   Risk Potential: No suicidal or homicidal ideation   Orientation:   Oriented x 3     Progress Toward Goals:  Not much improvement  Principal Problem:    Paranoid schizophrenia ([facility] [address] )  Active Problems:    Mixed hyperlipidemia    Tobacco abuse    Marijuana abuse    Intercostal pain      Recommended Treatment:     Continue Zyprexa 25 mg HS    Continue with pharmacotherapy, group therapy, milieu therapy and occupational therapy    The patient will be maintained on the following medications:    Current Facility-Administered Medications:  acetaminophen 650 mg Oral Q6H PRN Carol Going, CRNP   aluminum-magnesium hydroxide-simethicone 30 mL Oral Q4H PRN Carol Going, CRNP   haloperidol 2 mg Oral Q8H PRN Bird Bullard MD   hydrOXYzine HCL 25 mg Oral Q6H PRN Vernejorge l Captain Cook, MD   LORazepam 1 mg Oral Q8H PRN Christiannejorge l Captain Cook, MD   magnesium hydroxide 30 mL Oral Daily PRN NADEEN Phelan   nicotine 1 patch Transdermal Daily NADEEN Phelan   nicotine polacrilex 2 mg Oral Q2H PRN Kate Vidal MD   OLANZapine 25 mg Oral HS NADEEN Phelan   OLANZapine 5 mg Oral Q12H PRN Kate Vidal MD   traZODone 25 mg Oral HS PRN Kate Vidal MD

## 2020-02-28 NOTE — TREATMENT TEAM
02/28/20 0800   Team Members Present   Team Members Present Physician;Nurse;; Other (Discipline and Name); Occupational Therapist   Physician Team Member Obed Jose    Nursing Team Member [facility] Management Team Member Herb Diaz    OT Team Member Garfield Pardo    Other (Discipline and Name) Irais      Pt discussed at treatment team, no medication changes made

## 2020-02-28 NOTE — TREATMENT TEAM
Pt attended 1/3 group  Pt delusional and religiously;y preoccupied  Limited expression and blunt affect  02/28/20 1400   Activity/Group Checklist   Group Other (Comment)  (positive reflection)   Attendance Attended   Attendance Duration (min) 31-45   Interactions Interacted appropriately   Affect/Mood Appropriate   Goals Achieved Identified feelings; Discussed coping strategies; Able to listen to others; Able to engage in interactions; Able to reflect/comment on own behavior;Able to manage/cope with feelings;Verbalized increased hopefulness; Able to self-disclose

## 2020-02-28 NOTE — PROGRESS NOTES
Pt quiet and blunted but pleasant  Continues to be paranoid with grandiose delusions  VSS  Good appetite and steady gait  Monitored for safety and support  Offers no other complaints at this time

## 2020-02-29 PROCEDURE — 99232 SBSQ HOSP IP/OBS MODERATE 35: CPT | Performed by: PHYSICIAN ASSISTANT

## 2020-02-29 RX ADMIN — NICOTINE 1 PATCH: 14 PATCH, EXTENDED RELEASE TRANSDERMAL at 08:14

## 2020-02-29 RX ADMIN — OLANZAPINE 25 MG: 10 TABLET, ORALLY DISINTEGRATING ORAL at 21:31

## 2020-02-29 NOTE — NURSING NOTE
Patient is compliant with AM medications but only has a Nicotine Patch ordered for the AM   Patient remains isolative to his room and does not interact with peers or remain visible on the unit  Patient denies auditory or visual hallucinations and is not reported delusional thoughts of the Caodaism nature as prior when first admitted believing he was " the one" or "God"  No reports or responding to internal stimuli and all the patient has been doing is laying in bed in between meals  Patient encouraged to attend groups but declined same  No new issues or concerns noted at this time

## 2020-02-29 NOTE — PLAN OF CARE
Problem: PSYCHOSIS  Goal: Will report no hallucinations or delusions  Description  Interventions:  - Administer medication as  ordered  - Every waking shifts and PRN assess for the presence of hallucinations and or delusions  - Assist with reality testing to support increasing orientation  - Assess if patient's hallucinations or delusions are encouraging self-harm or harm to others and intervene as appropriate  Outcome: Progressing     Problem: SELF CARE DEFICIT  Goal: Return ADL status to a safe level of function  Description  INTERVENTIONS:  - Administer medication as ordered  - Assess ADL deficits and provide assistive devices as needed  - Obtain PT/OT consults as needed  - Assist and instruct patient to increase activity and self care as tolerated  Outcome: Progressing     Problem: Alteration in Thoughts and Perception  Goal: Treatment Goal: Gain control of psychotic behaviors/thinking, reduce/eliminate presenting symptoms and demonstrate improved reality functioning upon discharge  Outcome: Progressing  Goal: Verbalize thoughts and feelings  Description  Interventions:  - Promote a nonjudgmental and trusting relationship with the patient through active listening and therapeutic communication  - Assess patient's level of functioning, behavior and potential for risk  - Engage patient in 1 on 1 interactions  - Encourage patient to express fears, feelings, frustrations, and discuss symptoms    - [place] patient to reality, help patient recognize reality-based thinking   - Administer medications as ordered and assess for potential side effects  - Provide the patient education related to the signs and symptoms of the illness and desired effects of prescribed medications  Outcome: Progressing  Goal: Refrain from acting on delusional thinking/internal stimuli  Description  Interventions:  - Monitor patient closely, per order   - Utilize least restrictive measures   - Set reasonable limits, give positive feedback for acceptable   - Administer medications as ordered and monitor of potential side effects  Outcome: Progressing  Goal: Agree to be compliant with medication regime, as prescribed and report medication side effects  Description  Interventions:  - Offer appropriate PRN medication and supervise ingestion; conduct AIMS, as needed   Outcome: Progressing  Goal: Attend and participate in unit activities, including therapeutic, recreational, and educational groups  Description  Interventions:  -Encourage Visitation and family involvement in care  Outcome: Progressing  Goal: Recognize dysfunctional thoughts, communicate reality-based thoughts at the time of discharge  Description  Interventions:  - Provide medication and psycho-education to assist patient in compliance and developing insight into his/her illness   Outcome: Progressing  Goal: Complete daily ADLs, including personal hygiene independently, as able  Description  Interventions:  - Observe, teach, and assist patient with ADLS  - Monitor and promote a balance of rest/activity, with adequate nutrition and elimination   Outcome: Progressing     Problem: DISCHARGE PLANNING - CARE MANAGEMENT  Goal: Discharge to post-acute care or home with appropriate resources  Description  INTERVENTIONS:  - Conduct assessment to determine patient/family and health care team treatment goals, and need for post-acute services based on payer coverage, community resources, and patient preferences, and barriers to discharge  - Address psychosocial, clinical, and financial barriers to discharge as identified in assessment in conjunction with the patient/family and health care team  - Arrange appropriate level of post-acute services according to patients   needs and preference and payer coverage in collaboration with the physician and health care team  - Communicate with and update the patient/family, physician, and health care team regarding progress on the discharge plan  - Arrange appropriate transportation to post-acute venues  Outcome: Progressing     Problem: DISCHARGE PLANNING  Goal: Discharge to home or other facility with appropriate resources  Description  INTERVENTIONS:  - Identify barriers to discharge w/patient and caregiver  - Arrange for needed discharge resources and transportation as appropriate  - Identify discharge learning needs (meds, wound care, etc )  - Arrange for interpretive services to assist at discharge as needed  - Refer to Case Management Department for coordinating discharge planning if the patient needs post-hospital services based on physician/advanced practitioner order or complex needs related to functional status, cognitive ability, or social support system  Outcome: Progressing     Problem: Ineffective Coping  Goal: Participates in unit activities  Description  Interventions:  - Provide therapeutic environment   - Provide required programming   - Redirect inappropriate behaviors   Outcome: Progressing

## 2020-02-29 NOTE — PROGRESS NOTES
Progress Note - [address] [facility] Ad Taylor 49 y o  male MRN: [id number]  Unit/Bed#: Cole [facility] 207-14 Encounter: [id number]    Assessment/Plan   Principal Problem:    Paranoid schizophrenia ([address] )  Active Problems:    Mixed hyperlipidemia    Tobacco abuse    Marijuana abuse    Intercostal pain      Subjective:  Patient seclusive to his room and seen sleeping in the morning  States Zyprexa makes him sleep for extended period of time  States he does not mind taking this medication and sleeping too much is not an issue for him  Appeared to have lack of energy  Was seen out eating food later in the day  No manic behavior  No agitation  Denied hallucinations  Denied grandiose delusion that he was God today  States he feels well wants discharge  Medication compliant  Appears to be tolerating Zyprexa well without serious side effects  Current Medications:  Current Facility-Administered Medications   Medication Dose Route Frequency    acetaminophen (TYLENOL) tablet 650 mg  650 mg Oral Q6H PRN    aluminum-magnesium hydroxide-simethicone (MYLANTA) 200-200-20 mg/5 mL oral suspension 30 mL  30 mL Oral Q4H PRN    haloperidol (HALDOL) tablet 2 mg  2 mg Oral Q8H PRN    hydrOXYzine HCL (ATARAX) tablet 25 mg  25 mg Oral Q6H PRN    LORazepam (ATIVAN) tablet 1 mg  1 mg Oral Q8H PRN    magnesium hydroxide (MILK OF MAGNESIA) 400 mg/5 mL oral suspension 30 mL  30 mL Oral Daily PRN    nicotine (NICODERM CQ) 14 mg/24hr TD 24 hr patch 1 patch  1 patch Transdermal Daily    nicotine polacrilex (NICORETTE) gum 2 mg  2 mg Oral Q2H PRN    OLANZapine (ZyPREXA ZYDIS) dispersible tablet 25 mg  25 mg Oral HS    OLANZapine (ZyPREXA) tablet 5 mg  5 mg Oral Q12H PRN    traZODone (DESYREL) tablet 25 mg  25 mg Oral HS PRN       Behavioral Health Medications: all current active meds have been reviewed and continue current psychiatric medications      Vitals:  Vitals:    02/29/20 0719   BP: 110/71   Pulse: 60   Resp: 16   Temp: Mell Castorena ) 97 4 °F (36 3 °C)   SpO2: 97%       Laboratory results:    I have personally reviewed all pertinent laboratory/tests results  Most Recent Labs:   Lab Results   Component Value Date    WBC 5 40 02/16/2020    RBC 4 81 02/16/2020    HGB 15 3 02/16/2020    HCT 44 7 02/16/2020     02/16/2020    RDW 13 9 02/16/2020    NEUTROABS 3 20 02/16/2020    SODIUM 139 02/17/2020    K 3 7 02/17/2020     02/17/2020    CO2 25 02/17/2020    BUN 11 02/17/2020    CREATININE 0 83 02/17/2020    GLUC 99 02/17/2020    GLUF 99 02/17/2020    CALCIUM 9 0 02/17/2020    AST 61 (H) 02/17/2020    ALT 28 02/17/2020    ALKPHOS 49 02/17/2020    TP 6 8 02/17/2020    ALB 3 7 02/17/2020    TBILI 0 60 02/17/2020    CHOLESTEROL 105 02/16/2020    HDL 35 (L) 02/16/2020    TRIG 60 02/16/2020    LDLCALC 58 02/16/2020    [facility] 70 02/16/2020    [id number] 1 290 04/01/2016       Psychiatric Review of Systems:  Behavior over the last 24 hours:  improved  Sleep: hypersomnia  Appetite: normal  Medication side effects: Yes possible sedation  ROS: no complaints, all others negative    Mental Status Evaluation:  Appearance:  casually dressed   Behavior:  guarded but cooperative   Speech:  normal pitch and normal volume   Mood:  "Okay"   Affect:  constricted and flat   Language sparse   Thought Process:  goal directed and Linear   Thought Content:  Appears to have decreased intensity of delusional material   Perceptual Disturbances: None   Risk Potential: Denied SI/HI    Potential for aggression no   Sensorium:  person, place and time/date   Cognition:  grossly intact   Consciousness:  sedated    Recent and Remote Memory fair   Attention: attention span appeared shorter than expected for age   Insight:  Partial   Judgment: Improved   Gait/Station: normal gait/station and normal balance   Motor Activity: no abnormal movements     Progress Toward Goals:  Progressing slowly    Recommended Treatment: Continue with group therapy, milieu therapy and occupational therapy  1   Continue current medications    Risks, benefits and possible side effects of Medications:   Risks, benefits, and possible side effects of medications explained to patient and patient verbalizes understanding        Carine See FIGUEROA

## 2020-02-29 NOTE — NURSING NOTE
Patient remains isolative to his room  He was visible only during snack time  He was pleasant and cooperative  Complaint with medications  Behavior appropriate  VSS  Patient is in bed  Eyes closed  Appears to be sleeping

## 2020-02-29 NOTE — PLAN OF CARE
Problem: PSYCHOSIS  Goal: Will report no hallucinations or delusions  Description  Interventions:  - Administer medication as  ordered  - Every waking shifts and PRN assess for the presence of hallucinations and or delusions  - Assist with reality testing to support increasing orientation  - Assess if patient's hallucinations or delusions are encouraging self-harm or harm to others and intervene as appropriate  Outcome: Progressing     Problem: SELF CARE DEFICIT  Goal: Return ADL status to a safe level of function  Description  INTERVENTIONS:  - Administer medication as ordered  - Assess ADL deficits and provide assistive devices as needed  - Obtain PT/OT consults as needed  - Assist and instruct patient to increase activity and self care as tolerated  Outcome: Progressing     Problem: Alteration in Thoughts and Perception  Goal: Treatment Goal: Gain control of psychotic behaviors/thinking, reduce/eliminate presenting symptoms and demonstrate improved reality functioning upon discharge  Outcome: Progressing  Goal: Verbalize thoughts and feelings  Description  Interventions:  - Promote a nonjudgmental and trusting relationship with the patient through active listening and therapeutic communication  - Assess patient's level of functioning, behavior and potential for risk  - Engage patient in 1 on 1 interactions  - Encourage patient to express fears, feelings, frustrations, and discuss symptoms    - [place] patient to reality, help patient recognize reality-based thinking   - Administer medications as ordered and assess for potential side effects  - Provide the patient education related to the signs and symptoms of the illness and desired effects of prescribed medications  Outcome: Progressing  Goal: Refrain from acting on delusional thinking/internal stimuli  Description  Interventions:  - Monitor patient closely, per order   - Utilize least restrictive measures   - Set reasonable limits, give positive feedback for acceptable   - Administer medications as ordered and monitor of potential side effects  Outcome: Progressing  Goal: Agree to be compliant with medication regime, as prescribed and report medication side effects  Description  Interventions:  - Offer appropriate PRN medication and supervise ingestion; conduct AIMS, as needed   Outcome: Progressing  Goal: Attend and participate in unit activities, including therapeutic, recreational, and educational groups  Description  Interventions:  -Encourage Visitation and family involvement in care  Outcome: Progressing  Goal: Recognize dysfunctional thoughts, communicate reality-based thoughts at the time of discharge  Description  Interventions:  - Provide medication and psycho-education to assist patient in compliance and developing insight into his/her illness   Outcome: Progressing  Goal: Complete daily ADLs, including personal hygiene independently, as able  Description  Interventions:  - Observe, teach, and assist patient with ADLS  - Monitor and promote a balance of rest/activity, with adequate nutrition and elimination   Outcome: Progressing     Problem: DISCHARGE PLANNING - CARE MANAGEMENT  Goal: Discharge to post-acute care or home with appropriate resources  Description  INTERVENTIONS:  - Conduct assessment to determine patient/family and health care team treatment goals, and need for post-acute services based on payer coverage, community resources, and patient preferences, and barriers to discharge  - Address psychosocial, clinical, and financial barriers to discharge as identified in assessment in conjunction with the patient/family and health care team  - Arrange appropriate level of post-acute services according to patients   needs and preference and payer coverage in collaboration with the physician and health care team  - Communicate with and update the patient/family, physician, and health care team regarding progress on the discharge plan  - Arrange appropriate transportation to post-acute venues  Outcome: Progressing     Problem: DISCHARGE PLANNING  Goal: Discharge to home or other facility with appropriate resources  Description  INTERVENTIONS:  - Identify barriers to discharge w/patient and caregiver  - Arrange for needed discharge resources and transportation as appropriate  - Identify discharge learning needs (meds, wound care, etc )  - Arrange for interpretive services to assist at discharge as needed  - Refer to Case Management Department for coordinating discharge planning if the patient needs post-hospital services based on physician/advanced practitioner order or complex needs related to functional status, cognitive ability, or social support system  Outcome: Progressing     Problem: Ineffective Coping  Goal: Participates in unit activities  Description  Interventions:  - Provide therapeutic environment   - Provide required programming   - Redirect inappropriate behaviors   Outcome: Progressing

## 2020-03-01 PROCEDURE — 99232 SBSQ HOSP IP/OBS MODERATE 35: CPT | Performed by: PHYSICIAN ASSISTANT

## 2020-03-01 RX ADMIN — OLANZAPINE 25 MG: 10 TABLET, ORALLY DISINTEGRATING ORAL at 21:23

## 2020-03-01 RX ADMIN — NICOTINE 1 PATCH: 14 PATCH, EXTENDED RELEASE TRANSDERMAL at 08:44

## 2020-03-01 NOTE — NURSING NOTE
Patient was visible on the unit  He was more interactive this evening and was joking around with writer  Behavior and interactions appropriate  Medication compliant  VSS  Patient denied any needs  He is currently in bed  Eyes closed  Appears to be sleeping

## 2020-03-01 NOTE — PLAN OF CARE
Problem: PSYCHOSIS  Goal: Will report no hallucinations or delusions  Description  Interventions:  - Administer medication as  ordered  - Every waking shifts and PRN assess for the presence of hallucinations and or delusions  - Assist with reality testing to support increasing orientation  - Assess if patient's hallucinations or delusions are encouraging self-harm or harm to others and intervene as appropriate  Outcome: Progressing     Problem: SELF CARE DEFICIT  Goal: Return ADL status to a safe level of function  Description  INTERVENTIONS:  - Administer medication as ordered  - Assess ADL deficits and provide assistive devices as needed  - Obtain PT/OT consults as needed  - Assist and instruct patient to increase activity and self care as tolerated  Outcome: Progressing     Problem: Alteration in Thoughts and Perception  Goal: Treatment Goal: Gain control of psychotic behaviors/thinking, reduce/eliminate presenting symptoms and demonstrate improved reality functioning upon discharge  Outcome: Progressing  Goal: Verbalize thoughts and feelings  Description  Interventions:  - Promote a nonjudgmental and trusting relationship with the patient through active listening and therapeutic communication  - Assess patient's level of functioning, behavior and potential for risk  - Engage patient in 1 on 1 interactions  - Encourage patient to express fears, feelings, frustrations, and discuss symptoms    - [place] patient to reality, help patient recognize reality-based thinking   - Administer medications as ordered and assess for potential side effects  - Provide the patient education related to the signs and symptoms of the illness and desired effects of prescribed medications  Outcome: Progressing  Goal: Refrain from acting on delusional thinking/internal stimuli  Description  Interventions:  - Monitor patient closely, per order   - Utilize least restrictive measures   - Set reasonable limits, give positive feedback for acceptable   - Administer medications as ordered and monitor of potential side effects  Outcome: Progressing  Goal: Agree to be compliant with medication regime, as prescribed and report medication side effects  Description  Interventions:  - Offer appropriate PRN medication and supervise ingestion; conduct AIMS, as needed   Outcome: Progressing  Goal: Attend and participate in unit activities, including therapeutic, recreational, and educational groups  Description  Interventions:  -Encourage Visitation and family involvement in care  Outcome: Progressing  Goal: Recognize dysfunctional thoughts, communicate reality-based thoughts at the time of discharge  Description  Interventions:  - Provide medication and psycho-education to assist patient in compliance and developing insight into his/her illness   Outcome: Progressing  Goal: Complete daily ADLs, including personal hygiene independently, as able  Description  Interventions:  - Observe, teach, and assist patient with ADLS  - Monitor and promote a balance of rest/activity, with adequate nutrition and elimination   Outcome: Progressing     Problem: DISCHARGE PLANNING - CARE MANAGEMENT  Goal: Discharge to post-acute care or home with appropriate resources  Description  INTERVENTIONS:  - Conduct assessment to determine patient/family and health care team treatment goals, and need for post-acute services based on payer coverage, community resources, and patient preferences, and barriers to discharge  - Address psychosocial, clinical, and financial barriers to discharge as identified in assessment in conjunction with the patient/family and health care team  - Arrange appropriate level of post-acute services according to patients   needs and preference and payer coverage in collaboration with the physician and health care team  - Communicate with and update the patient/family, physician, and health care team regarding progress on the discharge plan  - Arrange appropriate transportation to post-acute venues  Outcome: Progressing     Problem: DISCHARGE PLANNING  Goal: Discharge to home or other facility with appropriate resources  Description  INTERVENTIONS:  - Identify barriers to discharge w/patient and caregiver  - Arrange for needed discharge resources and transportation as appropriate  - Identify discharge learning needs (meds, wound care, etc )  - Arrange for interpretive services to assist at discharge as needed  - Refer to Case Management Department for coordinating discharge planning if the patient needs post-hospital services based on physician/advanced practitioner order or complex needs related to functional status, cognitive ability, or social support system  Outcome: Progressing     Problem: Ineffective Coping  Goal: Participates in unit activities  Description  Interventions:  - Provide therapeutic environment   - Provide required programming   - Redirect inappropriate behaviors   Outcome: Progressing

## 2020-03-01 NOTE — PROGRESS NOTES
Progress Note - [address] [facility] Brenda Taylor 49 y o  male MRN: [id number]  Unit/Bed#: [facility] 101[address]34 Encounter: [id number]    Assessment/Plan   Principal Problem:    Paranoid schizophrenia ([address] )  Active Problems:    Mixed hyperlipidemia    Tobacco abuse    Marijuana abuse    Intercostal pain      Subjective:  Patient more visible today  Reports feeling well and hopeful for discharge soon  Appears less paranoid and spoke more in length today  Denied grandiose delusions of being God  Focused on seeing his dog  Reports is therapeutic for him  Denied hallucinations at this time  Reports anxiety over regards if he is going to [facility] after discharge and says this makes his mind race  Reports depression is low but does appear to have low energy and apathy  Medication compliant  Tolerating medications well without serious side effects  Current Medications:  Current Facility-Administered Medications   Medication Dose Route Frequency    acetaminophen (TYLENOL) tablet 650 mg  650 mg Oral Q6H PRN    aluminum-magnesium hydroxide-simethicone (MYLANTA) 200-200-20 mg/5 mL oral suspension 30 mL  30 mL Oral Q4H PRN    haloperidol (HALDOL) tablet 2 mg  2 mg Oral Q8H PRN    hydrOXYzine HCL (ATARAX) tablet 25 mg  25 mg Oral Q6H PRN    LORazepam (ATIVAN) tablet 1 mg  1 mg Oral Q8H PRN    magnesium hydroxide (MILK OF MAGNESIA) 400 mg/5 mL oral suspension 30 mL  30 mL Oral Daily PRN    nicotine (NICODERM CQ) 14 mg/24hr TD 24 hr patch 1 patch  1 patch Transdermal Daily    nicotine polacrilex (NICORETTE) gum 2 mg  2 mg Oral Q2H PRN    OLANZapine (ZyPREXA ZYDIS) dispersible tablet 25 mg  25 mg Oral HS    OLANZapine (ZyPREXA) tablet 5 mg  5 mg Oral Q12H PRN    traZODone (DESYREL) tablet 25 mg  25 mg Oral HS PRN       Behavioral Health Medications: all current active meds have been reviewed and continue current psychiatric medications      Vitals:  Vitals:    03/01/20 0709   BP: 110/65   Pulse: (!) 52   Resp: 16   Temp: 98 6 °F (37 °C)   SpO2: 95%       Laboratory results:    I have personally reviewed all pertinent laboratory/tests results  Most Recent Labs:   Lab Results   Component Value Date    WBC 5 40 02/16/2020    RBC 4 81 02/16/2020    HGB 15 3 02/16/2020    HCT 44 7 02/16/2020     02/16/2020    RDW 13 9 02/16/2020    NEUTROABS 3 20 02/16/2020    SODIUM 139 02/17/2020    K 3 7 02/17/2020     02/17/2020    CO2 25 02/17/2020    BUN 11 02/17/2020    CREATININE 0 83 02/17/2020    GLUC 99 02/17/2020    GLUF 99 02/17/2020    CALCIUM 9 0 02/17/2020    AST 61 (H) 02/17/2020    ALT 28 02/17/2020    ALKPHOS 49 02/17/2020    TP 6 8 02/17/2020    ALB 3 7 02/17/2020    TBILI 0 60 02/17/2020    CHOLESTEROL 105 02/16/2020    HDL 35 (L) 02/16/2020    TRIG 60 02/16/2020    LDLCALC 58 02/16/2020    [facility] 70 02/16/2020    [id number] 1 290 04/01/2016       Psychiatric Review of Systems:  Behavior over the last 24 hours:  unchanged  Sleep: hypersomnia  Appetite: normal  Medication side effects: No  ROS: no complaints, all others negative    Mental Status Evaluation:  Appearance:  casually dressed   Behavior:  guarded   Speech:  soft   Mood:  less depressed   Affect:  mood-congruent   Language appropriate   Thought Process:  goal directed and logical   Thought Content:  delusional material appears less intense   Perceptual Disturbances: None   Risk Potential: Denied SI/HI  Potential for aggression: No   Sensorium:  person, place and time/date   Cognition:  grossly intact   Consciousness:  alert and awake    Recent and Remote Memory fair   Attention: attention span appeared shorter than expected for age   Insight:  partial   Judgment: improving   Gait/Station: normal gait/station and normal balance   Motor Activity: no abnormal movements     Progress Toward Goals: progressing slowly     Recommended Treatment: Continue with group therapy, milieu therapy and occupational therapy  1   Continue current medications   2  Disposition planning     Risks, benefits and possible side effects of Medications:   Risks, benefits, and possible side effects of medications explained to patient and patient verbalizes understanding        Татьяна Tate PA-C

## 2020-03-01 NOTE — NURSING NOTE
Patient is compliant with his Nicotine Patch this morning  No complaints of pain or discomfort  Patient is out of his room more often but remains isolative to his self  Patient with no verbal outburst and does not appear to be responding to internal stimuli  Patient's appetite is good with patient eating 100% of meals  Patient is cooperative with staff but guarded and limited in his responses to staff  No new issues or concerns noted at this time

## 2020-03-02 PROCEDURE — 99232 SBSQ HOSP IP/OBS MODERATE 35: CPT | Performed by: PSYCHIATRY & NEUROLOGY

## 2020-03-02 RX ORDER — LANOLIN ALCOHOL/MO/W.PET/CERES
6 CREAM (GRAM) TOPICAL
Status: DISCONTINUED | OUTPATIENT
Start: 2020-03-02 | End: 2020-03-04 | Stop reason: HOSPADM

## 2020-03-02 RX ORDER — OLANZAPINE 10 MG/1
30 TABLET, ORALLY DISINTEGRATING ORAL
Status: DISCONTINUED | OUTPATIENT
Start: 2020-03-02 | End: 2020-03-04 | Stop reason: HOSPADM

## 2020-03-02 RX ADMIN — OLANZAPINE 30 MG: 10 TABLET, ORALLY DISINTEGRATING ORAL at 21:20

## 2020-03-02 RX ADMIN — MELATONIN TAB 3 MG 6 MG: 3 TAB at 21:20

## 2020-03-02 RX ADMIN — NICOTINE 1 PATCH: 14 PATCH, EXTENDED RELEASE TRANSDERMAL at 08:00

## 2020-03-02 NOTE — PLAN OF CARE
Problem: PSYCHOSIS  Goal: Will report no hallucinations or delusions  Description  Interventions:  - Administer medication as  ordered  - Every waking shifts and PRN assess for the presence of hallucinations and or delusions  - Assist with reality testing to support increasing orientation  - Assess if patient's hallucinations or delusions are encouraging self-harm or harm to others and intervene as appropriate  Outcome: Progressing     Problem: SELF CARE DEFICIT  Goal: Return ADL status to a safe level of function  Description  INTERVENTIONS:  - Administer medication as ordered  - Assess ADL deficits and provide assistive devices as needed  - Obtain PT/OT consults as needed  - Assist and instruct patient to increase activity and self care as tolerated  Outcome: Progressing     Problem: Alteration in Thoughts and Perception  Goal: Treatment Goal: Gain control of psychotic behaviors/thinking, reduce/eliminate presenting symptoms and demonstrate improved reality functioning upon discharge  Outcome: Progressing  Goal: Verbalize thoughts and feelings  Description  Interventions:  - Promote a nonjudgmental and trusting relationship with the patient through active listening and therapeutic communication  - Assess patient's level of functioning, behavior and potential for risk  - Engage patient in 1 on 1 interactions  - Encourage patient to express fears, feelings, frustrations, and discuss symptoms    - [place] patient to reality, help patient recognize reality-based thinking   - Administer medications as ordered and assess for potential side effects  - Provide the patient education related to the signs and symptoms of the illness and desired effects of prescribed medications  Outcome: Progressing  Goal: Refrain from acting on delusional thinking/internal stimuli  Description  Interventions:  - Monitor patient closely, per order   - Utilize least restrictive measures   - Set reasonable limits, give positive feedback for acceptable   - Administer medications as ordered and monitor of potential side effects  Outcome: Progressing  Goal: Agree to be compliant with medication regime, as prescribed and report medication side effects  Description  Interventions:  - Offer appropriate PRN medication and supervise ingestion; conduct AIMS, as needed   Outcome: Progressing  Goal: Attend and participate in unit activities, including therapeutic, recreational, and educational groups  Description  Interventions:  -Encourage Visitation and family involvement in care  Outcome: Progressing  Goal: Recognize dysfunctional thoughts, communicate reality-based thoughts at the time of discharge  Description  Interventions:  - Provide medication and psycho-education to assist patient in compliance and developing insight into his/her illness   Outcome: Progressing  Goal: Complete daily ADLs, including personal hygiene independently, as able  Description  Interventions:  - Observe, teach, and assist patient with ADLS  - Monitor and promote a balance of rest/activity, with adequate nutrition and elimination   Outcome: Progressing     Problem: DISCHARGE PLANNING - CARE MANAGEMENT  Goal: Discharge to post-acute care or home with appropriate resources  Description  INTERVENTIONS:  - Conduct assessment to determine patient/family and health care team treatment goals, and need for post-acute services based on payer coverage, community resources, and patient preferences, and barriers to discharge  - Address psychosocial, clinical, and financial barriers to discharge as identified in assessment in conjunction with the patient/family and health care team  - Arrange appropriate level of post-acute services according to patients   needs and preference and payer coverage in collaboration with the physician and health care team  - Communicate with and update the patient/family, physician, and health care team regarding progress on the discharge plan  - Arrange appropriate transportation to post-acute venues  Outcome: Progressing     Problem: DISCHARGE PLANNING  Goal: Discharge to home or other facility with appropriate resources  Description  INTERVENTIONS:  - Identify barriers to discharge w/patient and caregiver  - Arrange for needed discharge resources and transportation as appropriate  - Identify discharge learning needs (meds, wound care, etc )  - Arrange for interpretive services to assist at discharge as needed  - Refer to Case Management Department for coordinating discharge planning if the patient needs post-hospital services based on physician/advanced practitioner order or complex needs related to functional status, cognitive ability, or social support system  Outcome: Progressing     Problem: Ineffective Coping  Goal: Participates in unit activities  Description  Interventions:  - Provide therapeutic environment   - Provide required programming   - Redirect inappropriate behaviors   Outcome: Progressing

## 2020-03-02 NOTE — PROGRESS NOTES
Pt out and present in the milieu with peers  Pt noted associating with peers while watching TV  No delusions, no paranoia noted  Pt reported being worried and anxious when he thinks of pending legal issues  Pt reassured at this moment  No further concerns

## 2020-03-02 NOTE — PROGRESS NOTES
[address] [facility] Inpatient Geriatric Psychiatry  Psychiatrist's Progress Note    Patient Name: Lauren Bass  MRN: [id number]  DOS: 03/02/20     Chief Complaint:  psychosis    Interval History: Per staff, patient continues to be delusional though less paranoid but having sustained delusions that he is God  Patient does state he is God but he seems to be more preoccupied about his legal issues  Denies AH  He also mentions he is sleeping poorly though mostly in the form of taking a long time to fall asleep but has no problems with sleep maintenance  Medication compliant  Adverse effects of medications: denies      Mental Status Exam [Per above +]  Appearance: limited grooming; fair hygiene; no abnormal involuntary movements noted  Behavior: guarded, some psychomotor retardation  Speech: not pressured; limited spontaneity  Mood: anxious  Affect: irritable, stable, constricted  Thought process: linear, somewhat illogical  Thought content: grandiose delusions elicited; denies SI/HI  Perceptual disturbances: denies AH/VH  Cognition: oriented to all domains     Insight: limited  Judgement: improving      Current Facility-Administered Medications:     acetaminophen (TYLENOL) tablet 650 mg, 650 mg, Oral, Q6H PRN, NADEEN Maldonado, 650 mg at 02/24/20 2128    aluminum-magnesium hydroxide-simethicone (MYLANTA) 200-200-20 mg/5 mL oral suspension 30 mL, 30 mL, Oral, Q4H PRN, NADEEN Maldonado    haloperidol (HALDOL) tablet 2 mg, 2 mg, Oral, Q8H PRN, Gissel Hall MD    hydrOXYzine HCL (ATARAX) tablet 25 mg, 25 mg, Oral, Q6H PRN, Gissel Hall MD    LORazepam (ATIVAN) tablet 1 mg, 1 mg, Oral, Q8H PRN, Gissel Hall MD, 1 mg at 02/24/20 2129    magnesium hydroxide (MILK OF MAGNESIA) 400 mg/5 mL oral suspension 30 mL, 30 mL, Oral, Daily PRN, NADEEN Maldonado    melatonin tablet 6 mg, 6 mg, Oral, HS, Vanessa Best MD    nicotine (NICODERM CQ) 14 mg/24hr TD 24 hr patch 1 patch, 1 patch, Transdermal, Daily, NADEEN Macdonald, 1 patch at 03/02/20 0800    nicotine polacrilex (NICORETTE) gum 2 mg, 2 mg, Oral, Q2H PRN, Kari De MD, 2 mg at 02/26/20 1519    OLANZapine (ZyPREXA ZYDIS) dispersible tablet 30 mg, 30 mg, Oral, HS, Allan Weller MD    OLANZapine (ZyPREXA) tablet 5 mg, 5 mg, Oral, Q12H PRN, Kari De MD    traZODone (DESYREL) tablet 25 mg, 25 mg, Oral, HS PRN, Kari De MD, 25 mg at 02/27/20 2204    Vitals:    03/02/20 1513   BP: 107/66   Pulse: 85   Resp: 16   Temp: 97 6 °F (36 4 °C)   SpO2: 96%       Lab/work up: none    Assessment:     Axis I:  - Paranoid schizophrenia ([facility] [address] ) (principle dx)   - cannabis use d/o  Axis II:  - deferred  Axis III:  -HLD  Axis IV:  - limited social support, on disability    Progress: mild improvement    Plan:   1  Disposition: Patient will be discharged to home when there is an absence of psychosis [id number]  2  Legal status: 303  3  Psychopharmacologic interventions:  - Increase olanzapine to 30mg po qhs  - Add melatonin 6mg po qhs to address poor sleep initiation  - Continue to monitor psychosis  4  Medical comorbidities: Hospitalist following as needed  5  Other therapies: Individual/group/milieu therapy as appropriate  6  Psychoeducation: Discussed risks and benefits of medications at initiation of therapy; patient/family verbalized understanding and agreed with plan  7  Social issues: no acute needs  8  Work up: none  9   Precautions: Routine q7min checks, vitals qshift    Betsy Mooney MD  03/02/20

## 2020-03-02 NOTE — TREATMENT TEAM
03/02/20 0800   Team Meeting   Meeting Type Daily Rounds   Team Members Present   Team Members Present Physician;Nurse;; Other (Discipline and Name); Occupational Therapist   Physician Team Member Dr Alem Mcbride Team Member [facility] Management Team Member Angela Pickens    OT Team Member Garret Carrel    Other (Discipline and Name) Irais      Pt discussed at treatment team today, no medication changes made

## 2020-03-02 NOTE — CMS CERTIFICATION NOTE
Recertification: Based upon physical, mental and social evaluations, I certify that inpatient psychiatric services continue to be medically necessary for this patient for a duration of 7 midnights for the treatment of  Paranoid schizophrenia [facility])   Available alternative community resources still do not meet the patient's mental health care needs  I further attest that an established written individualized plan of care has been updated and is outlined in the patient's medical records

## 2020-03-02 NOTE — NURSING NOTE
Patient is compliant with medications and meals  Appetite is good with patient eating 100% of meals  No complaints of pain or discomfort  Patient is more visible on the unit and no delusional thoughts noted or reported  Patient is cooperative with staff and no verbal or physical behaviors noted  No new concerns noted at this time

## 2020-03-02 NOTE — TREATMENT TEAM
Pt did not attend groups when prompted       03/02/20 1000   Activity/Group Checklist   Group Other (Comment)  ( open discussion)   Attendance Did not attend

## 2020-03-03 PROCEDURE — 99232 SBSQ HOSP IP/OBS MODERATE 35: CPT | Performed by: PSYCHIATRY & NEUROLOGY

## 2020-03-03 RX ADMIN — NICOTINE 1 PATCH: 14 PATCH, EXTENDED RELEASE TRANSDERMAL at 08:22

## 2020-03-03 RX ADMIN — MELATONIN TAB 3 MG 6 MG: 3 TAB at 21:38

## 2020-03-03 RX ADMIN — OLANZAPINE 30 MG: 10 TABLET, ORALLY DISINTEGRATING ORAL at 21:38

## 2020-03-03 NOTE — PROGRESS NOTES
Pt generally calm and interacting with peers on the unit  No delusions noted this shift   Pt in bed at time
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
## 2020-03-03 NOTE — TREATMENT TEAM
03/03/20 1100   Team Meeting   Meeting Type Daily Rounds   Team Members Present   Team Members Present Physician;Nurse;;Occupational Therapist;Other (Discipline and Name)   Physician Team Member dr Jihan Diaz Team Member Rosalinda Granados RN   Care Management Team Member Lady Gunnar Vega   OT Team Member Annmarie Ward   Other (Discipline and Name) Irais   Pt discussed during treatment rounds today,medication reviewed,continue to monitor with recent med  changes

## 2020-03-03 NOTE — NURSING NOTE
Awake and visible during breakfast,then isolative to his room  Pt reported that he didn't sleep well last night  Pt denies any depression or anxiety,denies any AH or VH  Declined groups despite encouragement  Will continue to monitor closely

## 2020-03-03 NOTE — PROGRESS NOTES
[facility] Inpatient Geriatric Psychiatry  Psychiatrist's Progress Note    Patient Name: Eli Bob  MRN: [id number]  DOS: 03/03/20     Chief Complaint:  psychosis    Interval History: Per staff, patient has been calm, interacting appropriately with peers  He is isolated to his room for most of the morning and some parts of the afternoon stating he has trouble getting to sleep at night  Writer tried to elicit his beliefs about being God to which he replied "I don't care about that " Denies AH/VH  He was educated on benefits of abstaining from cannabis use going forward  Medication compliant  Adverse effects of medications: denies      Mental Status Exam [Per above +]  Appearance: fair grooming, intact hygiene; no abnormal involuntary movements noted  Behavior: calm, cooperative, attentive  Speech: wnl  Mood: denies complaints  Affect: neutral, stable, constricted  Thought process: linear, logical  Thought content: no overt delusions elicited; denies SI/HI  Perceptual disturbances: denies AH/VH  Cognition: oriented to all domains    Insight: partially intact  Judgement: improved      Current Facility-Administered Medications:     acetaminophen (TYLENOL) tablet 650 mg, 650 mg, Oral, Q6H PRN, Aidanythe Ton CRNP, 650 mg at 02/24/20 2128    aluminum-magnesium hydroxide-simethicone (MYLANTA) 200-200-20 mg/5 mL oral suspension 30 mL, 30 mL, Oral, Q4H PRN, RODERICK MirelesNP    haloperidol (HALDOL) tablet 2 mg, 2 mg, Oral, Q8H PRN, Claire Chambers MD    hydrOXYzine HCL (ATARAX) tablet 25 mg, 25 mg, Oral, Q6H PRN, Claire Chambers MD    LORazepam (ATIVAN) tablet 1 mg, 1 mg, Oral, Q8H PRN, Claire Chambers MD, 1 mg at 02/24/20 2129    magnesium hydroxide (MILK OF MAGNESIA) 400 mg/5 mL oral suspension 30 mL, 30 mL, Oral, Daily PRN, RODERICK MirelesNP    melatonin tablet 6 mg, 6 mg, Oral, HS, Vladimir Gill MD, 6 mg at 03/02/20 2120    nicotine (NICODERM CQ) 14 mg/24hr TD 24 hr patch 1 patch, 1 patch, Transdermal, Daily, NADEEN Hurd, 1 patch at 03/03/20 0764    nicotine polacrilex (NICORETTE) gum 2 mg, 2 mg, Oral, Q2H PRN, Blayne Pavon MD, 2 mg at 02/26/20 1519    OLANZapine (ZyPREXA ZYDIS) dispersible tablet 30 mg, 30 mg, Oral, HS, Tru Blake MD, 30 mg at 03/02/20 2120    OLANZapine (ZyPREXA) tablet 5 mg, 5 mg, Oral, Q12H PRN, Blayne Pavon MD    traZODone (DESYREL) tablet 25 mg, 25 mg, Oral, HS PRN, Blayne Pavon MD, 25 mg at 02/27/20 2204    Vitals:    03/03/20 1521   BP: 101/58   Pulse: 85   Resp: 16   Temp: 97 9 °F (36 6 °C)   SpO2: 98%       Lab/work up: none    Assessment:     Axis I:  - Paranoid schizophrenia ([facility] [address] ) (principle dx)  - cannabis use d/o   Axis II:  - deferred  Axis III:  -HLD  Axis IV:  - limited social support, on disability    Progress: improved    Plan:   1  Disposition: Patient will be discharged to home tomorrow if there is sustained improvement  2  Legal status: 303   3  Psychopharmacologic interventions:  - Continue olanzapine 30mg po qhs  - Increase melatonin to 9mg po qhs  - Continue to monitor psychosis  4  Medical comorbidities: Hospitalist following as needed  5  Other therapies: Individual/group/milieu therapy as appropriate  6  Psychoeducation: Discussed risks and benefits of medications at initiation of therapy; patient/family verbalized understanding and agreed with plan  7  Social issues: needs ride to [place] tomorrow at [address]  8  Work up: none  9   Precautions: Routine q7min checks, vitals qshift    Sabi Cabrera MD  03/03/20

## 2020-03-03 NOTE — PLAN OF CARE
Problem: PSYCHOSIS  Goal: Will report no hallucinations or delusions  Description  Interventions:  - Administer medication as  ordered  - Every waking shifts and PRN assess for the presence of hallucinations and or delusions  - Assist with reality testing to support increasing orientation  - Assess if patient's hallucinations or delusions are encouraging self-harm or harm to others and intervene as appropriate  Outcome: Progressing     Problem: SELF CARE DEFICIT  Goal: Return ADL status to a safe level of function  Description  INTERVENTIONS:  - Administer medication as ordered  - Assess ADL deficits and provide assistive devices as needed  - Obtain PT/OT consults as needed  - Assist and instruct patient to increase activity and self care as tolerated  Outcome: Progressing     Problem: Alteration in Thoughts and Perception  Goal: Treatment Goal: Gain control of psychotic behaviors/thinking, reduce/eliminate presenting symptoms and demonstrate improved reality functioning upon discharge  Outcome: Progressing  Goal: Verbalize thoughts and feelings  Description  Interventions:  - Promote a nonjudgmental and trusting relationship with the patient through active listening and therapeutic communication  - Assess patient's level of functioning, behavior and potential for risk  - Engage patient in 1 on 1 interactions  - Encourage patient to express fears, feelings, frustrations, and discuss symptoms    - [place] patient to reality, help patient recognize reality-based thinking   - Administer medications as ordered and assess for potential side effects  - Provide the patient education related to the signs and symptoms of the illness and desired effects of prescribed medications  Outcome: Progressing  Goal: Refrain from acting on delusional thinking/internal stimuli  Description  Interventions:  - Monitor patient closely, per order   - Utilize least restrictive measures   - Set reasonable limits, give positive feedback for acceptable   - Administer medications as ordered and monitor of potential side effects  Outcome: Progressing  Goal: Agree to be compliant with medication regime, as prescribed and report medication side effects  Description  Interventions:  - Offer appropriate PRN medication and supervise ingestion; conduct AIMS, as needed   Outcome: Progressing  Goal: Attend and participate in unit activities, including therapeutic, recreational, and educational groups  Description  Interventions:  -Encourage Visitation and family involvement in care  Outcome: Progressing  Goal: Recognize dysfunctional thoughts, communicate reality-based thoughts at the time of discharge  Description  Interventions:  - Provide medication and psycho-education to assist patient in compliance and developing insight into his/her illness   Outcome: Progressing  Goal: Complete daily ADLs, including personal hygiene independently, as able  Description  Interventions:  - Observe, teach, and assist patient with ADLS  - Monitor and promote a balance of rest/activity, with adequate nutrition and elimination   Outcome: Progressing     Problem: Ineffective Coping  Goal: Participates in unit activities  Description  Interventions:  - Provide therapeutic environment   - Provide required programming   - Redirect inappropriate behaviors   Outcome: Progressing

## 2020-03-03 NOTE — NURSING NOTE
Patient is present in the dayroom and is out for meals  Pt is medication compliant and cooperative with care  Pt is visible and social with select peers  Pt is pleasant upon approach  Pt is currently denying all s/s at this time  Will continue to monitor

## 2020-03-04 VITALS
HEART RATE: 66 BPM | WEIGHT: 215.17 LBS | SYSTOLIC BLOOD PRESSURE: 112 MMHG | TEMPERATURE: 97.9 F | OXYGEN SATURATION: 97 % | RESPIRATION RATE: 12 BRPM | HEIGHT: 73 IN | DIASTOLIC BLOOD PRESSURE: 67 MMHG | BODY MASS INDEX: 28.52 KG/M2

## 2020-03-04 PROCEDURE — 99238 HOSP IP/OBS DSCHRG MGMT 30/<: CPT | Performed by: PSYCHIATRY & NEUROLOGY

## 2020-03-04 RX ORDER — LANOLIN ALCOHOL/MO/W.PET/CERES
6 CREAM (GRAM) TOPICAL
Qty: 14 TABLET | Refills: 0 | Status: SHIPPED | OUTPATIENT
Start: 2020-03-04 | End: 2020-04-16 | Stop reason: HOSPADM

## 2020-03-04 RX ORDER — OLANZAPINE 10 MG/1
10 TABLET ORAL
Qty: 7 TABLET | Refills: 0 | Status: SHIPPED | OUTPATIENT
Start: 2020-03-04 | End: 2020-04-16 | Stop reason: HOSPADM

## 2020-03-04 RX ORDER — NICOTINE 21 MG/24HR
1 PATCH, TRANSDERMAL 24 HOURS TRANSDERMAL DAILY
Qty: 28 PATCH | Refills: 0 | Status: SHIPPED | OUTPATIENT
Start: 2020-03-05 | End: 2020-04-16 | Stop reason: HOSPADM

## 2020-03-04 RX ORDER — OLANZAPINE 20 MG/1
20 TABLET ORAL
Qty: 7 TABLET | Refills: 0 | Status: SHIPPED | OUTPATIENT
Start: 2020-03-04 | End: 2020-04-16 | Stop reason: HOSPADM

## 2020-03-04 RX ADMIN — NICOTINE 1 PATCH: 14 PATCH, EXTENDED RELEASE TRANSDERMAL at 08:59

## 2020-03-04 NOTE — DISCHARGE INSTR - APPOINTMENTS
[facility] Psychiatry  [address]  [place] [place] [id number] [phone] [phone]  F: [phone]    March 10th To see Dr Martin Verdugo @ 1:30  On 3/18 @ 11:00 to see Sarahi Nava for therapy

## 2020-03-04 NOTE — TREATMENT TEAM
03/04/20 0800   Team Meeting   Meeting Type Daily Rounds   Team Members Present   Team Members Present Physician;Nurse;; Other (Discipline and Name)   Physician Team Member Dr Yohana Fagan Team Member [facility] Management Team Member Kristina Anna    Other (Discipline and Name) Irais      Pt discussed at treatment team today, no medication changes made   Planned discharge this afternoon

## 2020-03-04 NOTE — NURSING NOTE
Pt constricted but pleasant and med-compliant  Denied SI  Stated he can ignore any thoughts of being God  VSS  Pt expressed understanding of d/c meds and f/u instructions  Pt left unit accompanied by staff to meet  with all his belongings at 1250  Pt d/c'd to home  Monitored for safety and support

## 2020-03-04 NOTE — BH TRANSITION RECORD
Contact Information: If you have any questions, concerns, pended studies, tests and/or procedures, or emergencies regarding your inpatient behavioral health visit  Please contact [facility] older adult behavioral health unit 6B [phone] and ask to speak to a , nurse or physician  A contact is available 24 hours/ 7 days a week at this number  Summary of Procedures Performed During your Stay:  Below is a list of major procedures performed during your hospital stay and a summary of results:  - No major procedures performed  Pending Studies (From admission, onward)    None        If studies are pending at discharge, follow up with your PCP and/or referring provider

## 2020-03-04 NOTE — CASE MANAGEMENT
[place] [employer] has been notified of this discharge  Pt will be receiving a LYFT ride to his mothers home at [address] in [facility]  I have contacted Neelam Carroll at [place] and there is no Arvell Cagedev available today  LYFT will be here at 1:00 today to take pt home

## 2020-03-04 NOTE — CASE MANAGEMENT
Met with pt on 3/3 to discuss his upcoming discharge  He states he is ready and looks forward to seeing his "Diego" referring to his dog  He plans to go to his mothers home at [address] [facility] and retreive his dog, and his truck  He has been informed he must check in with [address]  He is aware we have notified [place] [employer] of his discharge  Pt has requested transportation to his mothers as he has no one to pick him up  Madison Hem is not available and LYKATIE has been arranged to pick him up today at 1:00  Pt is also aware of his upcoming appointments with Dr Claudio Almeida and therapist Ashtyn Garza at their new address  Those same appointments have been printed in 32 font on a separate paper for him to be able to read, since he has lost his reader glasses  Pt expressed comfort knowing that the police are not coming to pick him up
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
## 2020-04-04 ENCOUNTER — HOSPITAL ENCOUNTER (EMERGENCY)
Facility: HOSPITAL | Age: 55
End: 2020-04-05
Attending: EMERGENCY MEDICINE | Admitting: EMERGENCY MEDICINE
Payer: MEDICARE

## 2020-04-04 DIAGNOSIS — F20.0 PARANOID SCHIZOPHRENIA (HCC): Primary | ICD-10-CM

## 2020-04-04 DIAGNOSIS — F29 PSYCHOSIS (HCC): ICD-10-CM

## 2020-04-04 LAB
ALBUMIN SERPL BCP-MCNC: 3.8 G/DL (ref 3.5–5)
ALP SERPL-CCNC: 63 U/L (ref 46–116)
ALT SERPL W P-5'-P-CCNC: 24 U/L (ref 12–78)
ANION GAP SERPL CALCULATED.3IONS-SCNC: 9 MMOL/L (ref 4–13)
AST SERPL W P-5'-P-CCNC: 21 U/L (ref 5–45)
ATRIAL RATE: 79 BPM
BASOPHILS # BLD AUTO: 0.04 THOUSANDS/ΜL (ref 0–0.1)
BASOPHILS NFR BLD AUTO: 0 % (ref 0–1)
BILIRUB SERPL-MCNC: 0.6 MG/DL (ref 0.2–1)
BUN SERPL-MCNC: 20 MG/DL (ref 5–25)
CALCIUM SERPL-MCNC: 8.9 MG/DL (ref 8.3–10.1)
CHLORIDE SERPL-SCNC: 105 MMOL/L (ref 100–108)
CO2 SERPL-SCNC: 25 MMOL/L (ref 21–32)
CREAT SERPL-MCNC: 1.12 MG/DL (ref 0.6–1.3)
EOSINOPHIL # BLD AUTO: 0.04 THOUSAND/ΜL (ref 0–0.61)
EOSINOPHIL NFR BLD AUTO: 0 % (ref 0–6)
ERYTHROCYTE [DISTWIDTH] IN BLOOD BY AUTOMATED COUNT: 13.4 % (ref 11.6–15.1)
ETHANOL EXG-MCNC: 0 MG/DL
GFR SERPL CREATININE-BSD FRML MDRD: 75 ML/MIN/1.73SQ M
GLUCOSE SERPL-MCNC: 107 MG/DL (ref 65–140)
HCT VFR BLD AUTO: 39.5 % (ref 36.5–49.3)
HGB BLD-MCNC: 13.5 G/DL (ref 12–17)
IMM GRANULOCYTES # BLD AUTO: 0.06 THOUSAND/UL (ref 0–0.2)
IMM GRANULOCYTES NFR BLD AUTO: 1 % (ref 0–2)
LYMPHOCYTES # BLD AUTO: 1.27 THOUSANDS/ΜL (ref 0.6–4.47)
LYMPHOCYTES NFR BLD AUTO: 11 % (ref 14–44)
MCH RBC QN AUTO: 31.3 PG (ref 26.8–34.3)
MCHC RBC AUTO-ENTMCNC: 34.2 G/DL (ref 31.4–37.4)
MCV RBC AUTO: 92 FL (ref 82–98)
MONOCYTES # BLD AUTO: 0.75 THOUSAND/ΜL (ref 0.17–1.22)
MONOCYTES NFR BLD AUTO: 6 % (ref 4–12)
NEUTROPHILS # BLD AUTO: 9.53 THOUSANDS/ΜL (ref 1.85–7.62)
NEUTS SEG NFR BLD AUTO: 82 % (ref 43–75)
NRBC BLD AUTO-RTO: 0 /100 WBCS
P AXIS: 53 DEGREES
PLATELET # BLD AUTO: 238 THOUSANDS/UL (ref 149–390)
PMV BLD AUTO: 10.9 FL (ref 8.9–12.7)
POTASSIUM SERPL-SCNC: 3.1 MMOL/L (ref 3.5–5.3)
PR INTERVAL: 140 MS
PROT SERPL-MCNC: 7.3 G/DL (ref 6.4–8.2)
QRS AXIS: -9 DEGREES
QRSD INTERVAL: 116 MS
QT INTERVAL: 408 MS
QTC INTERVAL: 467 MS
RBC # BLD AUTO: 4.31 MILLION/UL (ref 3.88–5.62)
SODIUM SERPL-SCNC: 139 MMOL/L (ref 136–145)
T WAVE AXIS: 61 DEGREES
TSH SERPL DL<=0.05 MIU/L-ACNC: 1.11 UIU/ML (ref 0.36–3.74)
VENTRICULAR RATE: 79 BPM
WBC # BLD AUTO: 11.69 THOUSAND/UL (ref 4.31–10.16)

## 2020-04-04 PROCEDURE — 84443 ASSAY THYROID STIM HORMONE: CPT

## 2020-04-04 PROCEDURE — 99285 EMERGENCY DEPT VISIT HI MDM: CPT

## 2020-04-04 PROCEDURE — 85025 COMPLETE CBC W/AUTO DIFF WBC: CPT

## 2020-04-04 PROCEDURE — 99285 EMERGENCY DEPT VISIT HI MDM: CPT | Performed by: EMERGENCY MEDICINE

## 2020-04-04 PROCEDURE — 93005 ELECTROCARDIOGRAM TRACING: CPT

## 2020-04-04 PROCEDURE — 36415 COLL VENOUS BLD VENIPUNCTURE: CPT

## 2020-04-04 PROCEDURE — 80053 COMPREHEN METABOLIC PANEL: CPT

## 2020-04-04 PROCEDURE — 96372 THER/PROPH/DIAG INJ SC/IM: CPT

## 2020-04-04 PROCEDURE — 93010 ELECTROCARDIOGRAM REPORT: CPT | Performed by: INTERNAL MEDICINE

## 2020-04-04 PROCEDURE — 82075 ASSAY OF BREATH ETHANOL: CPT | Performed by: EMERGENCY MEDICINE

## 2020-04-04 RX ORDER — LORAZEPAM 2 MG/ML
2 INJECTION INTRAMUSCULAR ONCE
Status: COMPLETED | OUTPATIENT
Start: 2020-04-04 | End: 2020-04-04

## 2020-04-04 RX ORDER — DIPHENHYDRAMINE HYDROCHLORIDE 50 MG/ML
50 INJECTION INTRAMUSCULAR; INTRAVENOUS ONCE
Status: COMPLETED | OUTPATIENT
Start: 2020-04-04 | End: 2020-04-04

## 2020-04-04 RX ORDER — HALOPERIDOL 5 MG/ML
10 INJECTION INTRAMUSCULAR ONCE
Status: COMPLETED | OUTPATIENT
Start: 2020-04-04 | End: 2020-04-04

## 2020-04-04 RX ADMIN — DIPHENHYDRAMINE HYDROCHLORIDE 50 MG: 50 INJECTION, SOLUTION INTRAMUSCULAR; INTRAVENOUS at 20:36

## 2020-04-04 RX ADMIN — LORAZEPAM 2 MG: 2 INJECTION INTRAMUSCULAR; INTRAVENOUS at 20:35

## 2020-04-04 RX ADMIN — HALOPERIDOL LACTATE 10 MG: 5 INJECTION INTRAMUSCULAR at 20:35

## 2020-04-05 ENCOUNTER — HOSPITAL ENCOUNTER (INPATIENT)
Facility: HOSPITAL | Age: 55
LOS: 11 days | Discharge: HOME | DRG: 885 | End: 2020-04-16
Attending: PSYCHIATRY & NEUROLOGY | Admitting: PSYCHIATRY & NEUROLOGY
Payer: MEDICARE

## 2020-04-05 VITALS
BODY MASS INDEX: 28.42 KG/M2 | HEART RATE: 90 BPM | WEIGHT: 215.39 LBS | TEMPERATURE: 98.4 F | OXYGEN SATURATION: 100 % | SYSTOLIC BLOOD PRESSURE: 127 MMHG | DIASTOLIC BLOOD PRESSURE: 83 MMHG | RESPIRATION RATE: 16 BRPM

## 2020-04-05 DIAGNOSIS — F20.0 PARANOID SCHIZOPHRENIA (HCC): ICD-10-CM

## 2020-04-05 DIAGNOSIS — Z72.0 TOBACCO ABUSE: ICD-10-CM

## 2020-04-05 DIAGNOSIS — F25.9: Primary | ICD-10-CM

## 2020-04-05 PROBLEM — E87.6 HYPOKALEMIA: Status: ACTIVE | Noted: 2020-04-05

## 2020-04-05 PROBLEM — Z00.8 MEDICAL CLEARANCE FOR PSYCHIATRIC ADMISSION: Status: ACTIVE | Noted: 2020-04-05

## 2020-04-05 LAB
AMPHETAMINES SERPL QL SCN: POSITIVE
BACTERIA UR QL AUTO: ABNORMAL /HPF
BARBITURATES UR QL: NEGATIVE
BENZODIAZ UR QL: NEGATIVE
BILIRUB UR QL STRIP: ABNORMAL
CAOX CRY URNS QL MICRO: ABNORMAL /HPF
CLARITY UR: CLEAR
COCAINE UR QL: NEGATIVE
COLOR UR: YELLOW
GLUCOSE UR STRIP-MCNC: NEGATIVE MG/DL
HGB UR QL STRIP.AUTO: NEGATIVE
KETONES UR STRIP-MCNC: ABNORMAL MG/DL
LEUKOCYTE ESTERASE UR QL STRIP: NEGATIVE
METHADONE UR QL: NEGATIVE
NITRITE UR QL STRIP: NEGATIVE
NON-SQ EPI CELLS URNS QL MICRO: ABNORMAL /HPF
OPIATES UR QL SCN: NEGATIVE
OTHER STN SPEC: ABNORMAL
PCP UR QL: NEGATIVE
PH UR STRIP.AUTO: 5.5 [PH]
PROT UR STRIP-MCNC: ABNORMAL MG/DL
RBC #/AREA URNS AUTO: ABNORMAL /HPF
SP GR UR STRIP.AUTO: >=1.03 (ref 1–1.03)
THC UR QL: POSITIVE
UROBILINOGEN UR QL STRIP.AUTO: 4 E.U./DL
WBC #/AREA URNS AUTO: ABNORMAL /HPF

## 2020-04-05 PROCEDURE — 80307 DRUG TEST PRSMV CHEM ANLYZR: CPT | Performed by: EMERGENCY MEDICINE

## 2020-04-05 PROCEDURE — 81001 URINALYSIS AUTO W/SCOPE: CPT

## 2020-04-05 PROCEDURE — 99222 1ST HOSP IP/OBS MODERATE 55: CPT | Performed by: FAMILY MEDICINE

## 2020-04-05 PROCEDURE — 99222 1ST HOSP IP/OBS MODERATE 55: CPT | Performed by: PSYCHIATRY & NEUROLOGY

## 2020-04-05 RX ORDER — ACETAMINOPHEN 325 MG/1
650 TABLET ORAL EVERY 6 HOURS PRN
Status: CANCELLED | OUTPATIENT
Start: 2020-04-05

## 2020-04-05 RX ORDER — HALOPERIDOL 5 MG/ML
5 INJECTION INTRAMUSCULAR EVERY 6 HOURS PRN
Status: DISCONTINUED | OUTPATIENT
Start: 2020-04-05 | End: 2020-04-06

## 2020-04-05 RX ORDER — ACETAMINOPHEN 325 MG/1
650 TABLET ORAL EVERY 6 HOURS PRN
Status: DISCONTINUED | OUTPATIENT
Start: 2020-04-05 | End: 2020-04-12

## 2020-04-05 RX ORDER — TRAZODONE HYDROCHLORIDE 50 MG/1
50 TABLET ORAL
Status: CANCELLED | OUTPATIENT
Start: 2020-04-05

## 2020-04-05 RX ORDER — HALOPERIDOL 5 MG
5 TABLET ORAL EVERY 8 HOURS PRN
Status: CANCELLED | OUTPATIENT
Start: 2020-04-05

## 2020-04-05 RX ORDER — LORAZEPAM 2 MG/ML
2 INJECTION INTRAMUSCULAR EVERY 6 HOURS PRN
Status: CANCELLED | OUTPATIENT
Start: 2020-04-05

## 2020-04-05 RX ORDER — LORAZEPAM 1 MG/1
1 TABLET ORAL EVERY 8 HOURS PRN
Status: DISCONTINUED | OUTPATIENT
Start: 2020-04-05 | End: 2020-04-06

## 2020-04-05 RX ORDER — IBUPROFEN 600 MG/1
600 TABLET ORAL EVERY 8 HOURS PRN
Status: CANCELLED | OUTPATIENT
Start: 2020-04-05

## 2020-04-05 RX ORDER — BENZTROPINE MESYLATE 1 MG/ML
2 INJECTION INTRAMUSCULAR; INTRAVENOUS EVERY 6 HOURS PRN
Status: CANCELLED | OUTPATIENT
Start: 2020-04-05

## 2020-04-05 RX ORDER — HALOPERIDOL 5 MG/ML
5 INJECTION INTRAMUSCULAR EVERY 6 HOURS PRN
Status: CANCELLED | OUTPATIENT
Start: 2020-04-05

## 2020-04-05 RX ORDER — BENZTROPINE MESYLATE 1 MG/ML
2 INJECTION INTRAMUSCULAR; INTRAVENOUS EVERY 6 HOURS PRN
Status: DISCONTINUED | OUTPATIENT
Start: 2020-04-05 | End: 2020-04-06

## 2020-04-05 RX ORDER — IBUPROFEN 600 MG/1
600 TABLET ORAL EVERY 8 HOURS PRN
Status: DISCONTINUED | OUTPATIENT
Start: 2020-04-05 | End: 2020-04-12

## 2020-04-05 RX ORDER — BENZTROPINE MESYLATE 2 MG/1
2 TABLET ORAL EVERY 6 HOURS PRN
Status: DISCONTINUED | OUTPATIENT
Start: 2020-04-05 | End: 2020-04-16 | Stop reason: HOSPADM

## 2020-04-05 RX ORDER — POTASSIUM CHLORIDE 20 MEQ/1
40 TABLET, EXTENDED RELEASE ORAL ONCE
Status: COMPLETED | OUTPATIENT
Start: 2020-04-05 | End: 2020-04-05

## 2020-04-05 RX ORDER — BENZTROPINE MESYLATE 1 MG/1
2 TABLET ORAL EVERY 6 HOURS PRN
Status: CANCELLED | OUTPATIENT
Start: 2020-04-05

## 2020-04-05 RX ORDER — TRAZODONE HYDROCHLORIDE 50 MG/1
50 TABLET ORAL
Status: DISCONTINUED | OUTPATIENT
Start: 2020-04-05 | End: 2020-04-16 | Stop reason: HOSPADM

## 2020-04-05 RX ORDER — MAGNESIUM HYDROXIDE/ALUMINUM HYDROXICE/SIMETHICONE 120; 1200; 1200 MG/30ML; MG/30ML; MG/30ML
30 SUSPENSION ORAL EVERY 4 HOURS PRN
Status: CANCELLED | OUTPATIENT
Start: 2020-04-05

## 2020-04-05 RX ORDER — LORAZEPAM 1 MG/1
1 TABLET ORAL EVERY 8 HOURS PRN
Status: CANCELLED | OUTPATIENT
Start: 2020-04-05

## 2020-04-05 RX ORDER — LORAZEPAM 2 MG/ML
2 INJECTION INTRAMUSCULAR EVERY 6 HOURS PRN
Status: DISCONTINUED | OUTPATIENT
Start: 2020-04-05 | End: 2020-04-16 | Stop reason: HOSPADM

## 2020-04-05 RX ORDER — HALOPERIDOL 5 MG
5 TABLET ORAL EVERY 8 HOURS PRN
Status: DISCONTINUED | OUTPATIENT
Start: 2020-04-05 | End: 2020-04-06

## 2020-04-05 RX ORDER — MAGNESIUM HYDROXIDE/ALUMINUM HYDROXICE/SIMETHICONE 120; 1200; 1200 MG/30ML; MG/30ML; MG/30ML
30 SUSPENSION ORAL EVERY 4 HOURS PRN
Status: DISCONTINUED | OUTPATIENT
Start: 2020-04-05 | End: 2020-04-16 | Stop reason: HOSPADM

## 2020-04-05 RX ORDER — OLANZAPINE 10 MG/1
30 TABLET ORAL
Status: DISCONTINUED | OUTPATIENT
Start: 2020-04-05 | End: 2020-04-07

## 2020-04-05 RX ADMIN — HALOPERIDOL 5 MG: 5 TABLET ORAL at 09:32

## 2020-04-05 RX ADMIN — POTASSIUM CHLORIDE 40 MEQ: 1500 TABLET, EXTENDED RELEASE ORAL at 02:41

## 2020-04-05 RX ADMIN — OLANZAPINE 30 MG: 10 TABLET, FILM COATED ORAL at 21:19

## 2020-04-05 RX ADMIN — ACETAMINOPHEN 650 MG: 325 TABLET ORAL at 09:31

## 2020-04-05 RX ADMIN — POTASSIUM CHLORIDE 40 MEQ: 20 TABLET, EXTENDED RELEASE ORAL at 17:15

## 2020-04-05 RX ADMIN — LORAZEPAM 1 MG: 1 TABLET ORAL at 09:31

## 2020-04-06 LAB
ANION GAP SERPL CALCULATED.3IONS-SCNC: 4 MMOL/L (ref 5–14)
BUN SERPL-MCNC: 12 MG/DL (ref 5–25)
CALCIUM SERPL-MCNC: 8.4 MG/DL (ref 8.4–10.2)
CHLORIDE SERPL-SCNC: 109 MMOL/L (ref 97–108)
CHOLEST SERPL-MCNC: 90 MG/DL
CO2 SERPL-SCNC: 25 MMOL/L (ref 22–30)
CREAT SERPL-MCNC: 0.78 MG/DL (ref 0.7–1.5)
GFR SERPL CREATININE-BSD FRML MDRD: 103 ML/MIN/1.73SQ M
GLUCOSE P FAST SERPL-MCNC: 94 MG/DL (ref 70–99)
GLUCOSE SERPL-MCNC: 94 MG/DL (ref 70–99)
HDLC SERPL-MCNC: 33 MG/DL
LDLC SERPL CALC-MCNC: 43 MG/DL
NONHDLC SERPL-MCNC: 57 MG/DL
POTASSIUM SERPL-SCNC: 3.9 MMOL/L (ref 3.6–5)
SODIUM SERPL-SCNC: 138 MMOL/L (ref 137–147)
TRIGL SERPL-MCNC: 70 MG/DL

## 2020-04-06 PROCEDURE — 86592 SYPHILIS TEST NON-TREP QUAL: CPT | Performed by: PSYCHIATRY & NEUROLOGY

## 2020-04-06 PROCEDURE — 80061 LIPID PANEL: CPT | Performed by: PSYCHIATRY & NEUROLOGY

## 2020-04-06 PROCEDURE — 99232 SBSQ HOSP IP/OBS MODERATE 35: CPT | Performed by: PSYCHIATRY & NEUROLOGY

## 2020-04-06 PROCEDURE — 80048 BASIC METABOLIC PNL TOTAL CA: CPT | Performed by: FAMILY MEDICINE

## 2020-04-06 RX ORDER — BENZTROPINE MESYLATE 1 MG/ML
2 INJECTION INTRAMUSCULAR; INTRAVENOUS EVERY 6 HOURS PRN
Status: DISCONTINUED | OUTPATIENT
Start: 2020-04-06 | End: 2020-04-16 | Stop reason: HOSPADM

## 2020-04-06 RX ORDER — HALOPERIDOL 5 MG/ML
5 INJECTION INTRAMUSCULAR EVERY 6 HOURS PRN
Status: DISCONTINUED | OUTPATIENT
Start: 2020-04-06 | End: 2020-04-16 | Stop reason: HOSPADM

## 2020-04-06 RX ORDER — HALOPERIDOL 5 MG
5 TABLET ORAL EVERY 8 HOURS PRN
Status: DISCONTINUED | OUTPATIENT
Start: 2020-04-06 | End: 2020-04-16 | Stop reason: HOSPADM

## 2020-04-06 RX ORDER — LORAZEPAM 1 MG/1
1 TABLET ORAL EVERY 8 HOURS PRN
Status: DISCONTINUED | OUTPATIENT
Start: 2020-04-06 | End: 2020-04-16 | Stop reason: HOSPADM

## 2020-04-06 RX ORDER — DIVALPROEX SODIUM 500 MG/1
500 TABLET, DELAYED RELEASE ORAL EVERY 12 HOURS SCHEDULED
Status: DISCONTINUED | OUTPATIENT
Start: 2020-04-06 | End: 2020-04-07

## 2020-04-06 RX ADMIN — OLANZAPINE 30 MG: 10 TABLET, FILM COATED ORAL at 21:09

## 2020-04-07 LAB — RPR SER QL: NORMAL

## 2020-04-07 PROCEDURE — 99232 SBSQ HOSP IP/OBS MODERATE 35: CPT | Performed by: PSYCHIATRY & NEUROLOGY

## 2020-04-07 RX ORDER — LITHIUM 8 MEQ/5ML
8 SOLUTION ORAL 2 TIMES DAILY
Status: DISCONTINUED | OUTPATIENT
Start: 2020-04-07 | End: 2020-04-10

## 2020-04-07 RX ORDER — OLANZAPINE 10 MG/1
30 TABLET, ORALLY DISINTEGRATING ORAL
Status: DISCONTINUED | OUTPATIENT
Start: 2020-04-07 | End: 2020-04-08

## 2020-04-07 RX ADMIN — LITHIUM 8 MEQ: 8 SOLUTION ORAL at 12:08

## 2020-04-07 RX ADMIN — LITHIUM 8 MEQ: 8 SOLUTION ORAL at 17:19

## 2020-04-08 PROCEDURE — 99232 SBSQ HOSP IP/OBS MODERATE 35: CPT | Performed by: PSYCHIATRY & NEUROLOGY

## 2020-04-08 RX ORDER — OLANZAPINE 10 MG/1
20 TABLET, ORALLY DISINTEGRATING ORAL
Status: DISCONTINUED | OUTPATIENT
Start: 2020-04-08 | End: 2020-04-16 | Stop reason: HOSPADM

## 2020-04-08 RX ADMIN — LITHIUM 8 MEQ: 8 SOLUTION ORAL at 08:11

## 2020-04-08 RX ADMIN — OLANZAPINE 20 MG: 10 TABLET, ORALLY DISINTEGRATING ORAL at 21:41

## 2020-04-08 RX ADMIN — LITHIUM 8 MEQ: 8 SOLUTION ORAL at 17:12

## 2020-04-09 PROCEDURE — 99232 SBSQ HOSP IP/OBS MODERATE 35: CPT | Performed by: PSYCHIATRY & NEUROLOGY

## 2020-04-09 RX ORDER — NICOTINE 21 MG/24HR
1 PATCH, TRANSDERMAL 24 HOURS TRANSDERMAL DAILY
Status: DISCONTINUED | OUTPATIENT
Start: 2020-04-09 | End: 2020-04-16 | Stop reason: HOSPADM

## 2020-04-09 RX ADMIN — LITHIUM 8 MEQ: 8 SOLUTION ORAL at 17:38

## 2020-04-09 RX ADMIN — OLANZAPINE 20 MG: 10 TABLET, ORALLY DISINTEGRATING ORAL at 21:19

## 2020-04-09 RX ADMIN — LITHIUM 8 MEQ: 8 SOLUTION ORAL at 08:20

## 2020-04-09 RX ADMIN — NICOTINE 1 PATCH: 21 PATCH, EXTENDED RELEASE TRANSDERMAL at 10:58

## 2020-04-10 LAB — LITHIUM SERPL-SCNC: 0.4 MMOL/L (ref 0.6–1.2)

## 2020-04-10 PROCEDURE — 99232 SBSQ HOSP IP/OBS MODERATE 35: CPT | Performed by: PSYCHIATRY & NEUROLOGY

## 2020-04-10 PROCEDURE — 80178 ASSAY OF LITHIUM: CPT | Performed by: PSYCHIATRY & NEUROLOGY

## 2020-04-10 RX ORDER — LITHIUM 8 MEQ/5ML
8 SOLUTION ORAL EVERY MORNING
Status: DISCONTINUED | OUTPATIENT
Start: 2020-04-11 | End: 2020-04-14

## 2020-04-10 RX ORDER — LITHIUM 8 MEQ/5ML
16 SOLUTION ORAL
Status: DISCONTINUED | OUTPATIENT
Start: 2020-04-10 | End: 2020-04-14

## 2020-04-10 RX ADMIN — IBUPROFEN 600 MG: 600 TABLET ORAL at 08:07

## 2020-04-10 RX ADMIN — OLANZAPINE 20 MG: 10 TABLET, ORALLY DISINTEGRATING ORAL at 21:15

## 2020-04-10 RX ADMIN — NICOTINE 1 PATCH: 21 PATCH, EXTENDED RELEASE TRANSDERMAL at 08:06

## 2020-04-10 RX ADMIN — IBUPROFEN 600 MG: 600 TABLET ORAL at 19:28

## 2020-04-10 RX ADMIN — LITHIUM 16 MEQ: 8 SOLUTION ORAL at 21:15

## 2020-04-10 RX ADMIN — LITHIUM 8 MEQ: 8 SOLUTION ORAL at 08:06

## 2020-04-11 PROCEDURE — 99232 SBSQ HOSP IP/OBS MODERATE 35: CPT | Performed by: PSYCHIATRY & NEUROLOGY

## 2020-04-11 RX ADMIN — LITHIUM 8 MEQ: 8 SOLUTION ORAL at 08:59

## 2020-04-11 RX ADMIN — OLANZAPINE 20 MG: 10 TABLET, ORALLY DISINTEGRATING ORAL at 21:01

## 2020-04-11 RX ADMIN — LITHIUM 16 MEQ: 8 SOLUTION ORAL at 21:01

## 2020-04-11 RX ADMIN — NICOTINE 1 PATCH: 21 PATCH, EXTENDED RELEASE TRANSDERMAL at 09:00

## 2020-04-11 RX ADMIN — IBUPROFEN 600 MG: 600 TABLET ORAL at 15:51

## 2020-04-12 PROCEDURE — 99232 SBSQ HOSP IP/OBS MODERATE 35: CPT | Performed by: PSYCHIATRY & NEUROLOGY

## 2020-04-12 RX ORDER — ACETAMINOPHEN 325 MG/1
325 TABLET ORAL EVERY 6 HOURS PRN
Status: DISCONTINUED | OUTPATIENT
Start: 2020-04-12 | End: 2020-04-16 | Stop reason: HOSPADM

## 2020-04-12 RX ORDER — ACETAMINOPHEN 325 MG/1
650 TABLET ORAL EVERY 6 HOURS PRN
Status: DISCONTINUED | OUTPATIENT
Start: 2020-04-12 | End: 2020-04-16 | Stop reason: HOSPADM

## 2020-04-12 RX ORDER — ACETAMINOPHEN 325 MG/1
975 TABLET ORAL EVERY 6 HOURS PRN
Status: DISCONTINUED | OUTPATIENT
Start: 2020-04-12 | End: 2020-04-16 | Stop reason: HOSPADM

## 2020-04-12 RX ADMIN — NICOTINE 1 PATCH: 21 PATCH, EXTENDED RELEASE TRANSDERMAL at 09:02

## 2020-04-12 RX ADMIN — OLANZAPINE 20 MG: 10 TABLET, ORALLY DISINTEGRATING ORAL at 21:28

## 2020-04-12 RX ADMIN — LITHIUM 16 MEQ: 8 SOLUTION ORAL at 21:28

## 2020-04-12 RX ADMIN — LITHIUM 8 MEQ: 8 SOLUTION ORAL at 09:01

## 2020-04-13 PROCEDURE — 99232 SBSQ HOSP IP/OBS MODERATE 35: CPT | Performed by: PSYCHIATRY & NEUROLOGY

## 2020-04-13 RX ADMIN — LITHIUM 16 MEQ: 8 SOLUTION ORAL at 21:31

## 2020-04-13 RX ADMIN — LITHIUM 8 MEQ: 8 SOLUTION ORAL at 09:24

## 2020-04-13 RX ADMIN — OLANZAPINE 20 MG: 10 TABLET, ORALLY DISINTEGRATING ORAL at 21:30

## 2020-04-13 RX ADMIN — NICOTINE 1 PATCH: 21 PATCH, EXTENDED RELEASE TRANSDERMAL at 09:25

## 2020-04-14 PROCEDURE — 99232 SBSQ HOSP IP/OBS MODERATE 35: CPT | Performed by: PSYCHIATRY & NEUROLOGY

## 2020-04-14 RX ORDER — LITHIUM CARBONATE 300 MG/1
300 CAPSULE ORAL EVERY MORNING
Status: DISCONTINUED | OUTPATIENT
Start: 2020-04-15 | End: 2020-04-16 | Stop reason: HOSPADM

## 2020-04-14 RX ORDER — LITHIUM CARBONATE 300 MG/1
600 CAPSULE ORAL
Status: DISCONTINUED | OUTPATIENT
Start: 2020-04-15 | End: 2020-04-16 | Stop reason: HOSPADM

## 2020-04-14 RX ADMIN — LITHIUM 16 MEQ: 8 SOLUTION ORAL at 21:21

## 2020-04-14 RX ADMIN — LITHIUM 8 MEQ: 8 SOLUTION ORAL at 08:00

## 2020-04-14 RX ADMIN — NICOTINE 1 PATCH: 21 PATCH, EXTENDED RELEASE TRANSDERMAL at 08:02

## 2020-04-14 RX ADMIN — OLANZAPINE 20 MG: 10 TABLET, ORALLY DISINTEGRATING ORAL at 21:21

## 2020-04-15 LAB — LITHIUM SERPL-SCNC: 0.8 MMOL/L (ref 0.6–1.2)

## 2020-04-15 PROCEDURE — 80178 ASSAY OF LITHIUM: CPT | Performed by: PSYCHIATRY & NEUROLOGY

## 2020-04-15 PROCEDURE — 99232 SBSQ HOSP IP/OBS MODERATE 35: CPT | Performed by: PSYCHIATRY & NEUROLOGY

## 2020-04-15 RX ADMIN — OLANZAPINE 20 MG: 10 TABLET, ORALLY DISINTEGRATING ORAL at 21:11

## 2020-04-15 RX ADMIN — NICOTINE 1 PATCH: 21 PATCH, EXTENDED RELEASE TRANSDERMAL at 08:19

## 2020-04-15 RX ADMIN — LITHIUM CARBONATE 600 MG: 300 CAPSULE, GELATIN COATED ORAL at 21:11

## 2020-04-15 RX ADMIN — LITHIUM CARBONATE 300 MG: 300 CAPSULE, GELATIN COATED ORAL at 08:19

## 2020-04-16 VITALS
DIASTOLIC BLOOD PRESSURE: 64 MMHG | BODY MASS INDEX: 28.1 KG/M2 | HEART RATE: 65 BPM | HEIGHT: 73 IN | SYSTOLIC BLOOD PRESSURE: 130 MMHG | WEIGHT: 212 LBS | RESPIRATION RATE: 16 BRPM | OXYGEN SATURATION: 100 % | TEMPERATURE: 98.2 F

## 2020-04-16 PROCEDURE — 99239 HOSP IP/OBS DSCHRG MGMT >30: CPT | Performed by: PSYCHIATRY & NEUROLOGY

## 2020-04-16 RX ORDER — NICOTINE 21 MG/24HR
1 PATCH, TRANSDERMAL 24 HOURS TRANSDERMAL DAILY
Qty: 28 PATCH | Refills: 0 | Status: SHIPPED | OUTPATIENT
Start: 2020-04-17 | End: 2020-07-10 | Stop reason: HOSPADM

## 2020-04-16 RX ORDER — LITHIUM CARBONATE 600 MG/1
600 CAPSULE ORAL
Qty: 30 CAPSULE | Refills: 0 | Status: SHIPPED | OUTPATIENT
Start: 2020-04-16 | End: 2020-07-10 | Stop reason: HOSPADM

## 2020-04-16 RX ORDER — LITHIUM CARBONATE 300 MG/1
300 CAPSULE ORAL EVERY MORNING
Qty: 30 CAPSULE | Refills: 0 | Status: SHIPPED | OUTPATIENT
Start: 2020-04-17 | End: 2020-07-10 | Stop reason: HOSPADM

## 2020-04-16 RX ORDER — OLANZAPINE 20 MG/1
20 TABLET, ORALLY DISINTEGRATING ORAL
Qty: 30 TABLET | Refills: 0 | Status: SHIPPED | OUTPATIENT
Start: 2020-04-16 | End: 2020-07-10 | Stop reason: HOSPADM

## 2020-04-16 RX ADMIN — LITHIUM CARBONATE 300 MG: 300 CAPSULE, GELATIN COATED ORAL at 08:36

## 2020-04-16 RX ADMIN — NICOTINE 1 PATCH: 21 PATCH, EXTENDED RELEASE TRANSDERMAL at 08:36

## 2020-06-30 ENCOUNTER — HOSPITAL ENCOUNTER (EMERGENCY)
Facility: HOSPITAL | Age: 55
End: 2020-07-02
Attending: EMERGENCY MEDICINE | Admitting: EMERGENCY MEDICINE
Payer: MEDICARE

## 2020-06-30 DIAGNOSIS — N39.0 UTI (URINARY TRACT INFECTION): ICD-10-CM

## 2020-06-30 DIAGNOSIS — F29 PSYCHOSIS (HCC): Primary | ICD-10-CM

## 2020-06-30 DIAGNOSIS — Z00.8 MEDICAL CLEARANCE FOR PSYCHIATRIC ADMISSION: ICD-10-CM

## 2020-06-30 LAB
AMPHETAMINES SERPL QL SCN: NEGATIVE
BACTERIA UR QL AUTO: ABNORMAL /HPF
BARBITURATES UR QL: NEGATIVE
BENZODIAZ UR QL: NEGATIVE
BILIRUB UR QL STRIP: NEGATIVE
CLARITY UR: ABNORMAL
COCAINE UR QL: NEGATIVE
COLOR UR: YELLOW
ETHANOL EXG-MCNC: 0 MG/DL
GLUCOSE UR STRIP-MCNC: NEGATIVE MG/DL
HGB UR QL STRIP.AUTO: ABNORMAL
KETONES UR STRIP-MCNC: NEGATIVE MG/DL
LEUKOCYTE ESTERASE UR QL STRIP: ABNORMAL
METHADONE UR QL: NEGATIVE
NITRITE UR QL STRIP: NEGATIVE
NON-SQ EPI CELLS URNS QL MICRO: ABNORMAL /HPF
OPIATES UR QL SCN: NEGATIVE
OXYCODONE+OXYMORPHONE UR QL SCN: NEGATIVE
PCP UR QL: NEGATIVE
PH UR STRIP.AUTO: 7 [PH]
PROT UR STRIP-MCNC: NEGATIVE MG/DL
RBC #/AREA URNS AUTO: ABNORMAL /HPF
SP GR UR STRIP.AUTO: <=1.005 (ref 1–1.03)
THC UR QL: NEGATIVE
UROBILINOGEN UR QL STRIP.AUTO: 0.2 E.U./DL
WBC #/AREA URNS AUTO: ABNORMAL /HPF

## 2020-06-30 PROCEDURE — 82075 ASSAY OF BREATH ETHANOL: CPT | Performed by: EMERGENCY MEDICINE

## 2020-06-30 PROCEDURE — 99285 EMERGENCY DEPT VISIT HI MDM: CPT | Performed by: EMERGENCY MEDICINE

## 2020-06-30 PROCEDURE — 99285 EMERGENCY DEPT VISIT HI MDM: CPT

## 2020-06-30 PROCEDURE — 80307 DRUG TEST PRSMV CHEM ANLYZR: CPT | Performed by: EMERGENCY MEDICINE

## 2020-06-30 PROCEDURE — 96372 THER/PROPH/DIAG INJ SC/IM: CPT

## 2020-06-30 PROCEDURE — 81001 URINALYSIS AUTO W/SCOPE: CPT | Performed by: EMERGENCY MEDICINE

## 2020-06-30 RX ORDER — NICOTINE 21 MG/24HR
21 PATCH, TRANSDERMAL 24 HOURS TRANSDERMAL ONCE
Status: COMPLETED | OUTPATIENT
Start: 2020-06-30 | End: 2020-07-02

## 2020-06-30 RX ORDER — OLANZAPINE 10 MG/1
10 INJECTION, POWDER, LYOPHILIZED, FOR SOLUTION INTRAMUSCULAR ONCE AS NEEDED
Status: COMPLETED | OUTPATIENT
Start: 2020-06-30 | End: 2020-06-30

## 2020-06-30 RX ADMIN — OLANZAPINE 10 MG: 10 INJECTION, POWDER, FOR SOLUTION INTRAMUSCULAR at 23:07

## 2020-06-30 RX ADMIN — WATER 10 ML: 1 INJECTION INTRAMUSCULAR; INTRAVENOUS; SUBCUTANEOUS at 23:08

## 2020-06-30 NOTE — LETTER
Section I - General Information    Name of Patient: Graciela Zepeda                 : 1965    Medicare #:____________________  Transport Date: 20 (PCS is valid for round trips on this date and for all repetitive trips in the 60-day range as noted below )  Origin: [address]                                                         Destination:____[facility], [address]____________________________________________  Is the pt's stay covered under Medicare Part A (PPS/DRG)     (X_) YES  (_) NO  Closest appropriate facility? (X_) YES  (_) NO  If no, why is transport to more distant facility required?________________________  If hosp-hosp transfer, describe services needed at 2nd facility not available at 1st facility? _ IP Tx___________________________  If hospice pt, is this transport related to pt's terminal illness? (_) YES (_) NO Describe____________________________________    Section II - Medical Necessity Questionnaire  Ambulance transportation is medically necessary only if other means of transport are contraindicated or would be potentially harmful to the patient  To meet this requirement, the patient must either be "bed confined" or suffer from a condition such that transport by means other than ambulance is contraindicated by the patient's condition   The following questions must be answered by the medical professional signing below for this form to be valid:    1)  Describe the MEDICAL CONDITION (physical and/or mental) of this patient AT [address] [place] [address] that requires the patient to be transported in an ambulance and why transport by other means is contraindicated by the patient's condition:_SI, HI, Psychosis___________________________________________________________________________    2) Is the patient "bed confined" as defined below?     (_) YES  (X_) NO  To be "be confined" the patient must satisfy all three of the following conditions: (1) unable to get up from bed without Assistance; AND (2) unable to ambulate; AND (3) unable to sit in a chair or wheelchair  3) Can this patient safely be transported by car or wheelchair Eusebiovanijj Chiler (i e , seated during transport without a medical attendant or monitoring)?   (_) YES  (_X) NO    4) In addition to completing questions 1-3 above, please check any of the following conditions that apply*:  *Note: supporting documentation for any boxes checked must be maintained in the patient's medical records  (_)Contractures   (_)Non-Healed Fractures  (_)Patient is confused (_)Patient is comatose (_)Moderate/severe pain on movement (X_)Danger to self/others  (_)IV meds/fluids required (_)Patient is combative(X_)Need or possible need for restraints (_)DVT requires elevation of lower extremity  (_)Medical attendant required (_)Requires oxygen-unable to self administer (_)Special handling/isolation/infection control precautions required (_)Unable to tolerate seated position for time needed to transport (_)Hemodynamic monitoring required en route (_)Unable to sit in a chair or wheelchair due to decubitus ulcers or other wounds (_)Cardiac monitoring required en route (_)Morbid obesity requires additional personnel/equipment to safely handle patient (_)Orthopedic device (backboard, halo, pins, traction, brace, wedge, etc,) requiring special handling during transport (_)Other(specify)_______________________________________________    Section III - Signature of Physician or Healthcare Professional  I certify that the above information is true and correct based on my evaluation of this patient, and represent that the patient requires transport by ambulance and that other forms of transport are contraindicated   I understand that this information will be used by the Centers for Medicare and Medicaid Services (CMS) to support the determination of medical necessity for ambulance services, and I represent that I have personal knowledge of the patient's condition at time of transport  (_) If this box is checked, I also certify that the patient is physically or mentally incapable of signing the ambulance service's claim and that the institution with which I am affiliated has furnished care, services, or assistance to the patient  My signature below is made on behalf of the patient pursuant to 42 CFR §424 36(b)(4)  In accordance with 42 CFR §424 37, the specific reason(s) that the patient is physically or mentally incapable of signing the claim form is as follows: _________________________________________________________________________________________________________      Signature of Physician* or Healthcare Professional______________________________________________________________  Signature Date 07/01/20 (For scheduled repetitive transports, this form is not valid for transports performed more than 60 days after this date)    Printed Name & Credentials of Physician or Healthcare Professional (MD, DO, RN, etc )__A  DILIP Gifford______________  *Form must be signed by patient's attending physician for scheduled, repetitive transports   For non-repetitive, unscheduled ambulance transports, if unable to obtain the signature of the attending physician, any of the following may sign (choose appropriate option below)  (_) Physician Assistant (_)  Clinical Nurse Specialist (_)  Registered Nurse  (_)  Nurse Practitioner  (X_) Discharge Planner

## 2020-06-30 NOTE — LETTER
[facility] [address]  [address] [place] [id number]  Dept: [phone]            EMTALA TRANSFER CONSENT    NAME Nohemi Taylor                                         1965                              MRN [id number]    I have been informed of my rights regarding examination, treatment, and transfer   by Dr Cristine Meier MD    Benefits: Continuity of care    Risks: Potential for delay in receiving treatment      { ED EMTALA TRANSFER CHOICES:[id number]}    I authorize the performance of emergency medical procedures and treatments upon me in both transit and upon arrival at the receiving facility  Additionally, I authorize the release of any and all medical records to the receiving facility and request they be transported with me, if possible  I understand that the safest mode of transportation during a medical emergency is an ambulance and that the Hospital advocates the use of this mode of transport  Risks of traveling to the receiving facility by car, including absence of medical control, life sustaining equipment, such as oxygen, and medical personnel has been explained to me and I fully understand them  (KRISTIAN CORRECT BOX BELOW)  Yaz Counter  ]  I consent to the stated transfer and to be transported by ambulance/helicopter  [  ]  I consent to the stated transfer, but refuse transportation by ambulance and accept full responsibility for my transportation by car    I understand the risks of non-ambulance transfers and I exonerate the Hospital and its staff from any deterioration in my condition that results from this refusal     X___________________________________________    DATE  20  TIME__17:15______  Signature of patient or legally responsible individual signing on patient behalf           RELATIONSHIP TO PATIENT_________________________                                    Provider Certification    NAME Jocelyn Estrada  1965                              MRN [id number]    A medical screening exam was performed on the above named patient  Based on the examination:    Condition Necessitating Transfer The primary encounter diagnosis was Psychosis ([address] )  Diagnoses of UTI (urinary tract infection) and Medical clearance for psychiatric admission were also pertinent to this visit  Patient Condition: The patient has been stabilized such that within reasonable medical probability, no material deterioration of the patient condition or the condition of the unborn child(juanito) is likely to result from the transfer    Reason for Transfer: Level of Care needed not available at this facility    Transfer Requirements: Facility [facility], [address]   · Space available and qualified personnel available for treatment as acknowledged by PEGGY Trent @ [phone]  · Agreed to accept transfer and to provide appropriate medical treatment as acknowledged by       Dr Nereida Villalba  · Appropriate medical records of the examination and treatment of the patient are provided at the time of transfer   [facility], [address] __A  A _____  · Transfer will be performed by qualified personnel from [facility]  and appropriate transfer equipment as required, including the use of necessary and appropriate life support measures      Provider Certification: I have examined the patient and explained the following risks and benefits of being transferred/refusing transfer to the patient/family:  General risk, such as traffic hazards, adverse weather conditions, rough terrain or turbulence, possible failure of equipment (including vehicle or aircraft), or consequences of actions of persons outside the control of the transport personnel      Based on these reasonable risks and benefits to the patient and/or the unborn child(juanito), and based upon the information available at the time of the patients examination, I certify that the medical benefits reasonably to be expected from the provision of appropriate medical treatments at another medical facility outweigh the increasing risks, if any, to the individuals medical condition, and in the case of labor to the unborn child, from effecting the transfer      X____________________________________________ DATE 07/01/20        TIME_______      ORIGINAL - SEND TO MEDICAL RECORDS   COPY - SEND WITH PATIENT DURING TRANSFER

## 2020-07-01 LAB
ALBUMIN SERPL BCP-MCNC: 3.5 G/DL (ref 3.5–5)
ALP SERPL-CCNC: 59 U/L (ref 46–116)
ALT SERPL W P-5'-P-CCNC: 22 U/L (ref 12–78)
ANION GAP SERPL CALCULATED.3IONS-SCNC: 14 MMOL/L (ref 4–13)
AST SERPL W P-5'-P-CCNC: 16 U/L (ref 5–45)
BASOPHILS # BLD AUTO: 0.02 THOUSANDS/ΜL (ref 0–0.1)
BASOPHILS NFR BLD AUTO: 0 % (ref 0–1)
BILIRUB SERPL-MCNC: 0.5 MG/DL (ref 0.2–1)
BUN SERPL-MCNC: 12 MG/DL (ref 5–25)
CALCIUM SERPL-MCNC: 8.5 MG/DL (ref 8.3–10.1)
CHLORIDE SERPL-SCNC: 110 MMOL/L (ref 100–108)
CO2 SERPL-SCNC: 22 MMOL/L (ref 21–32)
CREAT SERPL-MCNC: 0.88 MG/DL (ref 0.6–1.3)
EOSINOPHIL # BLD AUTO: 0.11 THOUSAND/ΜL (ref 0–0.61)
EOSINOPHIL NFR BLD AUTO: 1 % (ref 0–6)
ERYTHROCYTE [DISTWIDTH] IN BLOOD BY AUTOMATED COUNT: 13.1 % (ref 11.6–15.1)
GFR SERPL CREATININE-BSD FRML MDRD: 98 ML/MIN/1.73SQ M
GLUCOSE SERPL-MCNC: 104 MG/DL (ref 65–140)
HCT VFR BLD AUTO: 42.9 % (ref 36.5–49.3)
HGB BLD-MCNC: 14.3 G/DL (ref 12–17)
IMM GRANULOCYTES # BLD AUTO: 0.04 THOUSAND/UL (ref 0–0.2)
IMM GRANULOCYTES NFR BLD AUTO: 1 % (ref 0–2)
LITHIUM SERPL-SCNC: <0.2 MMOL/L (ref 0.5–1)
LYMPHOCYTES # BLD AUTO: 1.97 THOUSANDS/ΜL (ref 0.6–4.47)
LYMPHOCYTES NFR BLD AUTO: 25 % (ref 14–44)
MCH RBC QN AUTO: 31.2 PG (ref 26.8–34.3)
MCHC RBC AUTO-ENTMCNC: 33.3 G/DL (ref 31.4–37.4)
MCV RBC AUTO: 94 FL (ref 82–98)
MONOCYTES # BLD AUTO: 0.4 THOUSAND/ΜL (ref 0.17–1.22)
MONOCYTES NFR BLD AUTO: 5 % (ref 4–12)
NEUTROPHILS # BLD AUTO: 5.21 THOUSANDS/ΜL (ref 1.85–7.62)
NEUTS SEG NFR BLD AUTO: 68 % (ref 43–75)
NRBC BLD AUTO-RTO: 0 /100 WBCS
PLATELET # BLD AUTO: 236 THOUSANDS/UL (ref 149–390)
PMV BLD AUTO: 10.4 FL (ref 8.9–12.7)
POTASSIUM SERPL-SCNC: 3.6 MMOL/L (ref 3.5–5.3)
PROT SERPL-MCNC: 6.9 G/DL (ref 6.4–8.2)
RBC # BLD AUTO: 4.58 MILLION/UL (ref 3.88–5.62)
SARS-COV-2 RNA RESP QL NAA+PROBE: NEGATIVE
SODIUM SERPL-SCNC: 146 MMOL/L (ref 136–145)
TROPONIN I SERPL-MCNC: <0.02 NG/ML
TSH SERPL DL<=0.05 MIU/L-ACNC: 0.87 UIU/ML (ref 0.36–3.74)
WBC # BLD AUTO: 7.75 THOUSAND/UL (ref 4.31–10.16)

## 2020-07-01 PROCEDURE — 96374 THER/PROPH/DIAG INJ IV PUSH: CPT

## 2020-07-01 PROCEDURE — 36415 COLL VENOUS BLD VENIPUNCTURE: CPT | Performed by: EMERGENCY MEDICINE

## 2020-07-01 PROCEDURE — 80178 ASSAY OF LITHIUM: CPT | Performed by: EMERGENCY MEDICINE

## 2020-07-01 PROCEDURE — 85025 COMPLETE CBC W/AUTO DIFF WBC: CPT | Performed by: EMERGENCY MEDICINE

## 2020-07-01 PROCEDURE — 80053 COMPREHEN METABOLIC PANEL: CPT | Performed by: EMERGENCY MEDICINE

## 2020-07-01 PROCEDURE — 84443 ASSAY THYROID STIM HORMONE: CPT | Performed by: EMERGENCY MEDICINE

## 2020-07-01 PROCEDURE — 96372 THER/PROPH/DIAG INJ SC/IM: CPT

## 2020-07-01 PROCEDURE — 87635 SARS-COV-2 COVID-19 AMP PRB: CPT | Performed by: EMERGENCY MEDICINE

## 2020-07-01 PROCEDURE — 93005 ELECTROCARDIOGRAM TRACING: CPT

## 2020-07-01 PROCEDURE — 84484 ASSAY OF TROPONIN QUANT: CPT | Performed by: EMERGENCY MEDICINE

## 2020-07-01 RX ORDER — LORAZEPAM 2 MG/ML
2 INJECTION INTRAMUSCULAR ONCE
Status: COMPLETED | OUTPATIENT
Start: 2020-07-01 | End: 2020-07-01

## 2020-07-01 RX ORDER — CEPHALEXIN 250 MG/1
500 CAPSULE ORAL EVERY 6 HOURS SCHEDULED
Status: DISCONTINUED | OUTPATIENT
Start: 2020-07-01 | End: 2020-07-02 | Stop reason: HOSPADM

## 2020-07-01 RX ORDER — OLANZAPINE 10 MG/1
10 TABLET, ORALLY DISINTEGRATING ORAL
Status: CANCELLED | OUTPATIENT
Start: 2020-07-01

## 2020-07-01 RX ORDER — TRAZODONE HYDROCHLORIDE 50 MG/1
50 TABLET ORAL
Status: CANCELLED | OUTPATIENT
Start: 2020-07-01

## 2020-07-01 RX ORDER — OLANZAPINE 10 MG/1
10 INJECTION, POWDER, LYOPHILIZED, FOR SOLUTION INTRAMUSCULAR ONCE
Status: COMPLETED | OUTPATIENT
Start: 2020-07-01 | End: 2020-07-01

## 2020-07-01 RX ORDER — LORAZEPAM 1 MG/1
1 TABLET ORAL EVERY 6 HOURS PRN
Status: CANCELLED | OUTPATIENT
Start: 2020-07-01

## 2020-07-01 RX ORDER — CEPHALEXIN 250 MG/1
500 CAPSULE ORAL 4 TIMES DAILY
Qty: 56 CAPSULE | Refills: 0 | Status: SHIPPED | OUTPATIENT
Start: 2020-07-01 | End: 2020-07-10 | Stop reason: HOSPADM

## 2020-07-01 RX ORDER — HALOPERIDOL 5 MG
5 TABLET ORAL EVERY 8 HOURS PRN
Status: CANCELLED | OUTPATIENT
Start: 2020-07-01

## 2020-07-01 RX ORDER — HALOPERIDOL 5 MG/ML
5 INJECTION INTRAMUSCULAR ONCE
Status: COMPLETED | OUTPATIENT
Start: 2020-07-01 | End: 2020-07-01

## 2020-07-01 RX ORDER — HYDROXYZINE HYDROCHLORIDE 25 MG/1
25 TABLET, FILM COATED ORAL EVERY 6 HOURS PRN
Status: CANCELLED | OUTPATIENT
Start: 2020-07-01

## 2020-07-01 RX ORDER — RISPERIDONE 1 MG/1
1 TABLET, ORALLY DISINTEGRATING ORAL EVERY 8 HOURS PRN
Status: CANCELLED | OUTPATIENT
Start: 2020-07-01

## 2020-07-01 RX ORDER — DIPHENHYDRAMINE HYDROCHLORIDE 50 MG/ML
25 INJECTION INTRAMUSCULAR; INTRAVENOUS ONCE
Status: COMPLETED | OUTPATIENT
Start: 2020-07-01 | End: 2020-07-01

## 2020-07-01 RX ORDER — OLANZAPINE 10 MG/1
5 INJECTION, POWDER, LYOPHILIZED, FOR SOLUTION INTRAMUSCULAR EVERY 6 HOURS PRN
Status: CANCELLED | OUTPATIENT
Start: 2020-07-01

## 2020-07-01 RX ORDER — CEPHALEXIN 250 MG/1
500 CAPSULE ORAL ONCE
Status: DISCONTINUED | OUTPATIENT
Start: 2020-07-01 | End: 2020-07-01

## 2020-07-01 RX ADMIN — DIPHENHYDRAMINE HYDROCHLORIDE 25 MG: 50 INJECTION, SOLUTION INTRAMUSCULAR; INTRAVENOUS at 02:28

## 2020-07-01 RX ADMIN — WATER 2.1 ML: 1 INJECTION INTRAMUSCULAR; INTRAVENOUS; SUBCUTANEOUS at 01:17

## 2020-07-01 RX ADMIN — OLANZAPINE 10 MG: 10 INJECTION, POWDER, FOR SOLUTION INTRAMUSCULAR at 17:24

## 2020-07-01 RX ADMIN — LORAZEPAM 2 MG: 2 INJECTION INTRAMUSCULAR; INTRAVENOUS at 02:28

## 2020-07-01 RX ADMIN — CEPHALEXIN 500 MG: 250 CAPSULE ORAL at 18:43

## 2020-07-01 RX ADMIN — OLANZAPINE 10 MG: 10 INJECTION, POWDER, FOR SOLUTION INTRAMUSCULAR at 01:16

## 2020-07-01 RX ADMIN — HALOPERIDOL LACTATE 5 MG: 5 INJECTION INTRAMUSCULAR at 11:25

## 2020-07-01 RX ADMIN — WATER 2.1 ML: 1 INJECTION INTRAMUSCULAR; INTRAVENOUS; SUBCUTANEOUS at 17:24

## 2020-07-01 RX ADMIN — LORAZEPAM 2 MG: 2 INJECTION INTRAMUSCULAR; INTRAVENOUS at 11:37

## 2020-07-01 RX ADMIN — NICOTINE 21 MG: 21 PATCH TRANSDERMAL at 04:31

## 2020-07-01 NOTE — ED NOTES
Pt states he needed to use the restroom  Pt was escorted to the restroom by security and police  Pt given scrub pants to change into and a urine to cup to provide a urine sample        Flora Vasquez RN  06/30/20 019

## 2020-07-01 NOTE — ED NOTES
Room has been cleared of all equipment not required to treat the patient       Madi Kc  07/01/20 2412

## 2020-07-01 NOTE — ED NOTES
Patient is accepted at [facility] [address] [facility]  Patient is accepted by Dr Roro Lowry per Nissa Marin  Transportation is arranged with Latasha Keita  Transportation is scheduled for 23:00  Patient may go to the floor after 19:00      CW informed Jonna Dupree of Intake of pt's ETA  Nurse report is to be called to [phone] prior to patient transfer      Juan Kwan [facility], [address]  07/01/20 16:44

## 2020-07-01 NOTE — PROGRESS NOTES
Patient is accepted at [facility]  Patient is accepted by Dr Shakira Smith*  Transportation is arranged with TBD    Transportation is scheduled for TBD  Nurse report is to be called to [phone] prior to patient transfer

## 2020-07-01 NOTE — ED NOTES
Pt in bed, placed on cardiac monitoring and pulse ox for closer monitoring due to sedation  Pt resting calmly at this time, lights dimmed in room        Mj Ramsey RN  72/48/47 7126

## 2020-07-01 NOTE — ED NOTES
CW spoke with Ritika Lopez of Intake who stated they do not have an appropriate bed available for pt at this time due to the already high acuity level @ [facility]  AM CW to resume bed search      Bessie Phan, [facility], [address]  07/01/20 00:41

## 2020-07-01 NOTE — ED NOTES
Pt has Medicare A & B, therefore no pre-cert is needed      Lala Olivera [facility], [address]  07/01/20 17:18
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
## 2020-07-01 NOTE — ED NOTES
Patient refusing blood work stating "I do not want blood work, nothing is wrong with me, I read the bible"  Provider made aware       Sherman Nielsen RN  07/01/20 3730
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
## 2020-07-01 NOTE — ED NOTES
Patient ambulated to the restroom  Patient is still refusing to have blood work and EKG done  Patient states " I don't need it, there is nothing wrong with me"       Nilesh Faulkner  07/01/20 0206

## 2020-07-01 NOTE — ED NOTES
Pt refused bloodwork and ECG  Pt got aggravated and refusal was honored  Pt lied down, then expressed that he "had enough"  Pt rushed to the door and exited the hospital  Pt is shirtless with no shoes; only wearing his sweat pants       [address]  07/01/20 7974

## 2020-07-01 NOTE — ED NOTES
RN asked patient to get the bloodwork  Patient refused  Provider notified       Ashley Roca RN  07/01/20 4757
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
## 2020-07-01 NOTE — ED CARE HANDOFF
Emergency Department Sign Out Note        Sign out and transfer of care from Dr Patricio Zaman  See Separate Emergency Department note  The patient, Randall Harper, was evaluated by the previous provider for psychiatric evaluation  Workup Completed:  Patient here for psychosis- found to have a UTI and was given antibiotics  302 was upheld and will be placed  He required medications overnight for agitaiton  ED Course / Workup Pending (followup): Informed by Manny Dalton, crisis worker, that since patient is 54 y/o - he needs blood work for clearance  Orders were placed  When staff was attempting to get blood work, patient became agitated and a danger to staff  He ran out of the ED and security was called  Patient was no where to be found so police were notified  Patient was found in the woods near the hospital and brought back  He was placed in restraints and given medications  Will reassess patient when more calm  ED Course as of Jul 03 0910   Wed Jul 01, 2020   0702 Psychosis- 302 which was upheld  Does not need blood work- medically cleared  Procedures  MDM    Disposition  Final diagnoses:   Psychosis ([facility] [address] )   UTI (urinary tract infection)     Time reflects when diagnosis was documented in both MDM as applicable and the Disposition within this note     Time User Action Codes Description Comment    7/1/2020  6:25 AM Shersachin BECKWITH Add [F29] Psychosis ([address] )     7/1/2020  6:25 AM Sheralyn Bautista T Add [N39 0] UTI (urinary tract infection)     7/1/2020  2:25 PM Cody Tovar Add [Z00 8] Medical clearance for psychiatric admission       ED Disposition     ED Disposition Condition Date/Time Comment    Transfer to [facility] Jul 2, 2020 10:35 AM Viral Taylor should be transferred out to [facility] and has been medically cleared          MD Documentation      Most Recent Value   Patient Condition  The patient has been stabilized such that within reasonable medical probability, no material deterioration of the patient condition or the condition of the unborn child(juanito) is likely to result from the transfer   Reason for Transfer  Level of Care needed not available at this facility   Benefits of Transfer  Continuity of care   Risks of Transfer  Potential for delay in receiving treatment   Accepting Physician  Dr Halie Jernigan Name, [address]   [place], [address]    (Name & Tel number)  PEGGY Freeman @ [phone]   Transported by (Company and Unit #)  [employer]   Provider Certification  General risk, such as traffic hazards, adverse weather conditions, rough terrain or turbulence, possible failure of equipment (including vehicle or aircraft), or consequences of actions of persons outside the control of the transport personnel      RN Documentation      [address] Name, [address]   [place], [address]    (Name & Tel number)  Melissa Carvajal @ [phone]   Report Given to  [facility]   Transported by Ena Pavon and Unit #)  [facility]   Level of Care  Basic life support   Transfer Date  07/01/20   Transfer Time  2300      Follow-up Information    None       Discharge Medication List as of 7/2/2020 10:36 AM      START taking these medications    Details   cephalexin (KEFLEX) 250 mg capsule Take 2 capsules (500 mg total) by mouth 4 (four) times a day for 7 days, Starting Wed 7/1/2020, Until Wed 7/8/2020, Print         CONTINUE these medications which have NOT CHANGED    Details   lithium carbonate 300 mg capsule Take 1 capsule (300 mg total) by mouth every morning, Starting Fri 4/17/2020, Until Sun 5/17/2020, Print      lithium carbonate 600 MG capsule Take 1 capsule (600 mg total) by mouth daily at bedtime, Starting Thu 4/16/2020, Until Sat 5/16/2020, Print      nicotine (NICODERM CQ) 21 mg/24 hr TD 24 hr patch Place 1 patch on the skin daily, Starting Fri 4/17/2020, Print      OLANZapine (ZyPREXA ZYDIS) 20 mg dispersible tablet Take 1 tablet (20 mg total) by mouth daily at bedtime, Starting Thu 4/16/2020, Until Sat 5/16/2020, Print           No discharge procedures on file         ED Provider  Electronically Signed by     Gabriel Fenton DO  07/03/20 6267

## 2020-07-02 ENCOUNTER — HOSPITAL ENCOUNTER (INPATIENT)
Facility: HOSPITAL | Age: 55
LOS: 8 days | Discharge: HOME | DRG: 885 | End: 2020-07-10
Attending: PSYCHIATRY & NEUROLOGY | Admitting: PSYCHIATRY & NEUROLOGY
Payer: MEDICARE

## 2020-07-02 VITALS
SYSTOLIC BLOOD PRESSURE: 109 MMHG | RESPIRATION RATE: 16 BRPM | OXYGEN SATURATION: 97 % | TEMPERATURE: 97.3 F | HEART RATE: 78 BPM | DIASTOLIC BLOOD PRESSURE: 67 MMHG

## 2020-07-02 DIAGNOSIS — Z72.0 TOBACCO ABUSE: ICD-10-CM

## 2020-07-02 DIAGNOSIS — Z00.8 MEDICAL CLEARANCE FOR PSYCHIATRIC ADMISSION: ICD-10-CM

## 2020-07-02 DIAGNOSIS — G47.00 INSOMNIA, UNSPECIFIED TYPE: ICD-10-CM

## 2020-07-02 DIAGNOSIS — F20.0 PARANOID SCHIZOPHRENIA (HCC): Primary | Chronic | ICD-10-CM

## 2020-07-02 PROCEDURE — 96372 THER/PROPH/DIAG INJ SC/IM: CPT

## 2020-07-02 PROCEDURE — NC001 PR NO CHARGE: Performed by: EMERGENCY MEDICINE

## 2020-07-02 PROCEDURE — 99222 1ST HOSP IP/OBS MODERATE 55: CPT | Performed by: PSYCHIATRY & NEUROLOGY

## 2020-07-02 RX ORDER — LORAZEPAM 2 MG/ML
2 INJECTION INTRAMUSCULAR ONCE
Status: COMPLETED | OUTPATIENT
Start: 2020-07-02 | End: 2020-07-02

## 2020-07-02 RX ORDER — LORAZEPAM 1 MG/1
1 TABLET ORAL EVERY 6 HOURS PRN
Status: DISCONTINUED | OUTPATIENT
Start: 2020-07-02 | End: 2020-07-08

## 2020-07-02 RX ORDER — HYDROXYZINE HYDROCHLORIDE 25 MG/1
25 TABLET, FILM COATED ORAL EVERY 6 HOURS PRN
Status: DISCONTINUED | OUTPATIENT
Start: 2020-07-02 | End: 2020-07-10 | Stop reason: HOSPADM

## 2020-07-02 RX ORDER — OLANZAPINE 5 MG/1
5 TABLET ORAL DAILY
Status: DISCONTINUED | OUTPATIENT
Start: 2020-07-02 | End: 2020-07-06

## 2020-07-02 RX ORDER — OLANZAPINE 10 MG/1
10 INJECTION, POWDER, LYOPHILIZED, FOR SOLUTION INTRAMUSCULAR ONCE
Status: COMPLETED | OUTPATIENT
Start: 2020-07-02 | End: 2020-07-02

## 2020-07-02 RX ORDER — OLANZAPINE 10 MG/1
5 INJECTION, POWDER, LYOPHILIZED, FOR SOLUTION INTRAMUSCULAR EVERY 6 HOURS PRN
Status: DISCONTINUED | OUTPATIENT
Start: 2020-07-02 | End: 2020-07-08

## 2020-07-02 RX ORDER — DIPHENHYDRAMINE HYDROCHLORIDE 50 MG/ML
50 INJECTION INTRAMUSCULAR; INTRAVENOUS ONCE
Status: COMPLETED | OUTPATIENT
Start: 2020-07-02 | End: 2020-07-02

## 2020-07-02 RX ORDER — HALOPERIDOL 5 MG
5 TABLET ORAL EVERY 8 HOURS PRN
Status: DISCONTINUED | OUTPATIENT
Start: 2020-07-02 | End: 2020-07-08

## 2020-07-02 RX ORDER — OLANZAPINE 10 MG/1
10 TABLET, ORALLY DISINTEGRATING ORAL
Status: DISCONTINUED | OUTPATIENT
Start: 2020-07-02 | End: 2020-07-07

## 2020-07-02 RX ORDER — TRAZODONE HYDROCHLORIDE 50 MG/1
50 TABLET ORAL
Status: DISCONTINUED | OUTPATIENT
Start: 2020-07-02 | End: 2020-07-08

## 2020-07-02 RX ORDER — RISPERIDONE 1 MG/1
1 TABLET, ORALLY DISINTEGRATING ORAL EVERY 8 HOURS PRN
Status: DISCONTINUED | OUTPATIENT
Start: 2020-07-02 | End: 2020-07-10 | Stop reason: HOSPADM

## 2020-07-02 RX ADMIN — CEPHALEXIN 500 MG: 250 CAPSULE ORAL at 01:15

## 2020-07-02 RX ADMIN — OLANZAPINE 10 MG: 10 INJECTION, POWDER, FOR SOLUTION INTRAMUSCULAR at 06:56

## 2020-07-02 RX ADMIN — DIPHENHYDRAMINE HYDROCHLORIDE 50 MG: 50 INJECTION, SOLUTION INTRAMUSCULAR; INTRAVENOUS at 10:09

## 2020-07-02 RX ADMIN — WATER 2.1 ML: 1 INJECTION INTRAMUSCULAR; INTRAVENOUS; SUBCUTANEOUS at 06:56

## 2020-07-02 RX ADMIN — LORAZEPAM 2 MG: 2 INJECTION INTRAMUSCULAR; INTRAVENOUS at 10:09

## 2020-07-02 NOTE — ED PROVIDER NOTES
The patient was getting ready to be transferred  He had been sleeping and snoring all night long, without any issues  EMS woke him up to move him to their stretcher and the patient woke up, yelling, screaming, cursing, saying I am God and asking do you know why a  m ?   He tried to get up and run, and began fighting staff when they tried to get him back into the bed  He was given an additional 10 milligrams of Zyprexa IM, and was able to be talked back down to sit quietly while waiting for the medication to kick in  As he will be transported with EMS about recovering closed ambulance, he will require restraints for a ride for patient and staff safety       Strar Kelly MD  07/02/20 4604

## 2020-07-02 NOTE — ED NOTES
Patient returned back to ER stretcher from EMS stretcher  Resting comfortably in bed  No complaints at this time  Will continue to monitor  1:1 back at bedside for monitoring       Rebecca Cardona RN  07/02/20 0803

## 2020-07-02 NOTE — TREATMENT PLAN
TREATMENT PLAN REVIEW - [address] [facility] 49 y o  1965 male MRN: [id number]    [address] Room / Bed: [facility] 386/[facility] [address]76 Encounter: [id number]        Admit Date/Time:  7/2/2020 11:31 AM    Treatment Team: Attending Provider: Jonathan Vigil MD; : Jerica Webb    Diagnosis: Principal Problem:    Paranoid schizophrenia [facility])    Patient Strengths/Assets: good past treatment response, good physical health      Patient Barriers/Limitations: chronic mental illness, lack of social/family support, noncompliant with medication    Short Term Goals: decrease in paranoid thoughts, decrease in psychotic symptoms    Long Term Goals: resolution of psychotic symptoms    Progress Towards Goals: starting psychiatric medications as prescribed    Recommended Treatment: medication management, patient medication education, group therapy, milieu therapy, continued Behavioral Health psychiatric evaluation/assessment process     Treatment Frequency: daily medication monitoring, group and milieu therapy daily, monitoring through interdisciplinary rounds, monitoring through weekly patient care conferences    Expected Discharge Date: 14 days - 7/16/2020    Discharge Plan: referral for outpatient medication management with a psychiatrist, referral for outpatient psychotherapy    Treatment Plan Created/Updated By: Lina Rodriguez MD

## 2020-07-02 NOTE — ED NOTES
Per [facility] from [facility] truck broke down and he is working on obtaining another ride for patient  Will contacted ED back if another ride is found  Charge RN [facility] [place] made aware        Gerri Lancaster RN  07/01/20 0450

## 2020-07-02 NOTE — ED NOTES
RN reached out to transportation facility regarding patient needing to be transferred in restraints  Per [place] @ [facility] charge and day shift states patient must be stable without restraints for 2 atleast hours       Ronni Rogers RN  07/02/20 8745
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
## 2020-07-02 NOTE — ED NOTES
When transportation arrived patient began screaming stating "I want my little one"  Patient uncooperative with staff       Iam Bejarano RN  07/02/20 1552

## 2020-07-02 NOTE — PROGRESS NOTES
Admission note Patient was admitted at 1115 He was admitted from the [facility] E D  He was sound asleep when admitted He was transferred from the liter to the bed  Patient stayed asleep He never woke up Will continue to monitor and speak to to him when he awakes
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
## 2020-07-02 NOTE — SOCIAL WORK
Worker attempted to arouse pt to inform pt on 303 rights and hearing to be held at 1500  Due to being chemically restrained prior to admission pt unable to be aroused  Pt is actively psychotic at this time

## 2020-07-03 PROCEDURE — 99232 SBSQ HOSP IP/OBS MODERATE 35: CPT | Performed by: PSYCHIATRY & NEUROLOGY

## 2020-07-03 RX ADMIN — OLANZAPINE 5 MG: 5 TABLET, FILM COATED ORAL at 10:22

## 2020-07-03 RX ADMIN — OLANZAPINE 10 MG: 10 TABLET, ORALLY DISINTEGRATING ORAL at 21:27

## 2020-07-03 NOTE — PROGRESS NOTES
Pt not responding to writer's attempts to administer AM medication  Pt did awaken for vitals and breakfast before returning to bed

## 2020-07-03 NOTE — PROGRESS NOTES
07/03/20 1456   Team Meeting   Meeting Type Tx Team Meeting   Initial Conference Date 07/03/20   Team Members Present   Team Members Present Physician;Nurse;   Physician Team Member Dr Ez Hernandez Team Member MALAIKA FERNANDO [facility] Management Team Member Rigo   Patient/Family Present   Patient Present Yes   Patient's Family Present No   Psychiatrist attempted to discuss treatment plan and goals but due to psychosis was not able to participate  Patient did not sign treatment plan

## 2020-07-03 NOTE — PROGRESS NOTES
Progress Note - [address] [employer] Brenda Taylor 49 y o  male MRN: [id number]  Unit/Bed#: [facility] 386-01 Encounter: [id number]    Assessment/Plan   Principal Problem:    Paranoid schizophrenia ([address] )      Behavior over the last 24 hours:  He has melissa sedated and sleeping all night  This morning he was up for vitals and meals  He is isolative to his room and in bed  He took his zyprexa this morning  The patient was seen in his room  He was awake with calling his name  He stated that he talks to God when asked if he is having hallucinations  He stated that he is paranoid at times  He was confused as to why he was admitted  He stated that he is on zyprexa at home at bedtime, but recalls not having any money to get it for a while  He denied current complaints but stated he just feels "worn down "  Sleep: hypersomnia  Appetite: normal  Medication side effects: No  ROS: no complaints    Mental Status Evaluation:  Appearance:  bearded, disheveled and older than stated age   Behavior:  guarded   Speech:  soft and vague   Mood:  constricted   Affect:  blunted and constricted   Thought Process:  blocked   Thought Content:  delusions  Taoist preoccupation   Perceptual Disturbances: Auditory hallucinations, vague   Risk Potential: Suicidal Ideations none  Homicidal Ideations none  Potential for Aggression yes   Sensorium:  person and place   Memory:  recent memory impaired   Consciousness:  awake when his name was called   Attention: attention span appeared shorter than expected for age   Insight:  limited   Judgment: limited   Gait/Station: not assessed as he was in bed   Motor Activity: no abnormal movements     Progress Toward Goals: "to take a shower"    Recommended Treatment: Continue with group therapy, milieu therapy and occupational therapy        Risks, benefits and possible side effects of Medications:   Risks, benefits, and possible side effects of medications explained to patient and patient verbalizes understanding  Medications:   all current active meds have been reviewed, continue current psychiatric medications and current meds:   Current Facility-Administered Medications   Medication Dose Route Frequency    haloperidol (HALDOL) tablet 5 mg  5 mg Oral Q8H PRN    hydrOXYzine HCL (ATARAX) tablet 25 mg  25 mg Oral Q6H PRN    LORazepam (ATIVAN) tablet 1 mg  1 mg Oral Q6H PRN    nicotine polacrilex (NICORETTE) gum 2 mg  2 mg Oral Q2H PRN    OLANZapine (ZyPREXA ZYDIS) dispersible tablet 10 mg  10 mg Oral HS    OLANZapine (ZyPREXA) IM injection 5 mg  5 mg Intramuscular Q6H PRN    OLANZapine (ZyPREXA) tablet 5 mg  5 mg Oral Daily    risperiDONE (RisperDAL M-TABS) dispersible tablet 1 mg  1 mg Oral Q8H PRN    traZODone (DESYREL) tablet 50 mg  50 mg Oral HS PRN     Labs: I have personally reviewed all pertinent laboratory/tests results  Most Recent Labs:   Lab Results   Component Value Date    WBC 7 75 07/01/2020    RBC 4 58 07/01/2020    HGB 14 3 07/01/2020    HCT 42 9 07/01/2020     07/01/2020    RDW 13 1 07/01/2020    NEUTROABS 5 21 07/01/2020    SODIUM 146 (H) 07/01/2020    K 3 6 07/01/2020     (H) 07/01/2020    CO2 22 07/01/2020    BUN 12 07/01/2020    CREATININE 0 88 07/01/2020    GLUC 104 07/01/2020    GLUF 94 04/06/2020    CALCIUM 8 5 07/01/2020    AST 16 07/01/2020    ALT 22 07/01/2020    ALKPHOS 59 07/01/2020    TP 6 9 07/01/2020    ALB 3 5 07/01/2020    TBILI 0 50 07/01/2020    CHOLESTEROL 90 04/06/2020    HDL 33 (L) 04/06/2020    TRIG 70 04/06/2020    LDLCALC 43 04/06/2020    NONHDLC 57 04/06/2020    LITHIUM <0 2 (L) 07/01/2020    [id number] 0 869 07/01/2020    RPR Non-Reactive 04/06/2020       Counseling / Coordination of Care  Total floor / unit time spent today 15 minutes  Greater than 50% of total time was spent with the patient and / or family counseling and / or coordination of care   A description of the counseling / coordination of care: staff and patient

## 2020-07-03 NOTE — TREATMENT TEAM
07/03/20 0700   Team Meeting   Meeting Type Daily Rounds   Team Members Present   Team Members Present Physician;Nurse;; Other (Discipline and Name)   Physician Team Member [id number] Team Member MALAIKA FERNANDO [facility] Management Team Member Rigo   Patient/Family Present   Patient Present No   Patient's Family Present No     New admission 302

## 2020-07-03 NOTE — PROGRESS NOTES
Pt isolative to room  Pt responded to writer when additional attempt made to administer AM zyprexa  Pt was compliant with medication  Pt said he also takes lithium and asked if he would be getting this  Pt irritable regarding admission  Pt said he is here because his sister called the police  Pt said he does not know why she called the police  Pt angry that he received IM medication in ED  Pt denies SI/HI  When asked about hallucinations, pt said "no, I wish I did " No behavioral issues thus far

## 2020-07-03 NOTE — PROGRESS NOTES
Pt has been sleeping during the evening only comes out for meal/pt refused his night medication, pt also refused to answer the writer question    Will continue to monitor

## 2020-07-04 PROBLEM — F20.0 PARANOID SCHIZOPHRENIA (HCC): Chronic | Status: ACTIVE | Noted: 2020-02-16

## 2020-07-04 LAB
ALBUMIN SERPL BCP-MCNC: 3.8 G/DL (ref 3–5.2)
ALP SERPL-CCNC: 51 U/L (ref 43–122)
ALT SERPL W P-5'-P-CCNC: 14 U/L (ref 9–52)
ANION GAP SERPL CALCULATED.3IONS-SCNC: 7 MMOL/L (ref 5–14)
AST SERPL W P-5'-P-CCNC: 21 U/L (ref 17–59)
ATRIAL RATE: 98 BPM
BASOPHILS # BLD AUTO: 0.1 THOUSANDS/ΜL (ref 0–0.1)
BASOPHILS NFR BLD AUTO: 1 % (ref 0–1)
BILIRUB SERPL-MCNC: 0.4 MG/DL
BUN SERPL-MCNC: 17 MG/DL (ref 5–25)
CALCIUM SERPL-MCNC: 9 MG/DL (ref 8.4–10.2)
CHLORIDE SERPL-SCNC: 106 MMOL/L (ref 97–108)
CO2 SERPL-SCNC: 25 MMOL/L (ref 22–30)
CREAT SERPL-MCNC: 0.87 MG/DL (ref 0.7–1.5)
EOSINOPHIL # BLD AUTO: 0.2 THOUSAND/ΜL (ref 0–0.4)
EOSINOPHIL NFR BLD AUTO: 3 % (ref 0–6)
ERYTHROCYTE [DISTWIDTH] IN BLOOD BY AUTOMATED COUNT: 13.7 %
GFR SERPL CREATININE-BSD FRML MDRD: 99 ML/MIN/1.73SQ M
GLUCOSE SERPL-MCNC: 97 MG/DL (ref 70–99)
HCT VFR BLD AUTO: 45.8 % (ref 41–53)
HGB BLD-MCNC: 15.8 G/DL (ref 13.5–17.5)
LYMPHOCYTES # BLD AUTO: 2.6 THOUSANDS/ΜL (ref 0.5–4)
LYMPHOCYTES NFR BLD AUTO: 40 % (ref 25–45)
MCH RBC QN AUTO: 32.5 PG (ref 26–34)
MCHC RBC AUTO-ENTMCNC: 34.4 G/DL (ref 31–36)
MCV RBC AUTO: 94 FL (ref 80–100)
MONOCYTES # BLD AUTO: 0.4 THOUSAND/ΜL (ref 0.2–0.9)
MONOCYTES NFR BLD AUTO: 6 % (ref 1–10)
NEUTROPHILS # BLD AUTO: 3.2 THOUSANDS/ΜL (ref 1.8–7.8)
NEUTS SEG NFR BLD AUTO: 49 % (ref 45–65)
P AXIS: 38 DEGREES
PLATELET # BLD AUTO: 221 THOUSANDS/UL (ref 150–450)
PMV BLD AUTO: 9.2 FL (ref 8.9–12.7)
POTASSIUM SERPL-SCNC: 4 MMOL/L (ref 3.6–5)
PR INTERVAL: 122 MS
PROT SERPL-MCNC: 6.9 G/DL (ref 5.9–8.4)
QRS AXIS: -19 DEGREES
QRSD INTERVAL: 94 MS
QT INTERVAL: 358 MS
QTC INTERVAL: 457 MS
RBC # BLD AUTO: 4.85 MILLION/UL (ref 4.5–5.9)
SODIUM SERPL-SCNC: 138 MMOL/L (ref 137–147)
T WAVE AXIS: 59 DEGREES
VENTRICULAR RATE: 98 BPM
WBC # BLD AUTO: 6.5 THOUSAND/UL (ref 4.5–11)

## 2020-07-04 PROCEDURE — 80053 COMPREHEN METABOLIC PANEL: CPT | Performed by: PSYCHIATRY & NEUROLOGY

## 2020-07-04 PROCEDURE — 85025 COMPLETE CBC W/AUTO DIFF WBC: CPT | Performed by: PSYCHIATRY & NEUROLOGY

## 2020-07-04 PROCEDURE — 93010 ELECTROCARDIOGRAM REPORT: CPT | Performed by: INTERNAL MEDICINE

## 2020-07-04 PROCEDURE — 99232 SBSQ HOSP IP/OBS MODERATE 35: CPT | Performed by: NURSE PRACTITIONER

## 2020-07-04 RX ADMIN — OLANZAPINE 10 MG: 10 TABLET, ORALLY DISINTEGRATING ORAL at 21:11

## 2020-07-04 RX ADMIN — OLANZAPINE 5 MG: 5 TABLET, FILM COATED ORAL at 09:08

## 2020-07-04 NOTE — PROGRESS NOTES
Patient is isolative to room  Med and meal compliant  Patient denies SI/HI, A/V hallucinations, anxiety and depression  Will continue to monitor for changes in current status

## 2020-07-04 NOTE — PROGRESS NOTES
Progress Note - [address] [place] Brenda Taylor 49 y o  male MRN: [id number]  Unit/Bed#: [facility] 377-01 Encounter: [id number]    Assessment/Plan   Principal Problem:    Paranoid schizophrenia ([address] )      Subjective:Patient was seen today for continuation of care, records reviewed and  patient was discussed with the morning case review team  Padmini Byrne was calm, pleasant on approach, visible on th unit but no socializing, sleeping well, good appetite and states he was "feeling much better"  He was scant and guarded in conversation; currently denies auditory and visual hallucinations, denies delusions and denies paranoia  Nejorge l Byrne was focused on discharge, states he was "wrongfully put here", presented somewhat paranoid and irritable  This writer attempted to asked if he was on Lithium and Padmini Byrne states "Zyprexa is the only medication that is good for me"  Padmini Byrne denies endorsing any suicidal or homicidal ideation  Does not seem to be experiencing any manic symptoms but psychotic symptoms still present during our encounter  He remains medication compliance and denies any side effects from medications  Will continue on current medication regimen  Vitals:  Vitals:    07/04/20 0739   BP: 108/69   Pulse: 78   Resp: 16   Temp: 97 5 °F (36 4 °C)   SpO2:        Laboratory results:    I have personally reviewed all pertinent laboratory/tests results    Most Recent Labs:   Lab Results   Component Value Date    WBC 6 50 07/04/2020    RBC 4 85 07/04/2020    HGB 15 8 07/04/2020    HCT 45 8 07/04/2020     07/04/2020    RDW 13 7 07/04/2020    NEUTROABS 3 20 07/04/2020    SODIUM 138 07/04/2020    K 4 0 07/04/2020     07/04/2020    CO2 25 07/04/2020    BUN 17 07/04/2020    CREATININE 0 87 07/04/2020    GLUC 97 07/04/2020    GLUF 94 04/06/2020    CALCIUM 9 0 07/04/2020    AST 21 07/04/2020    ALT 14 07/04/2020    ALKPHOS 51 07/04/2020    TP 6 9 07/04/2020    ALB 3 8 07/04/2020    TBILI 0 40 07/04/2020    CHOLESTEROL 90 04/06/2020    HDL 33 (L) 04/06/2020    TRIG 70 04/06/2020    LDLCALC 43 04/06/2020    NONHDLC 57 04/06/2020    LITHIUM <0 2 (L) 07/01/2020    [id number] 0 869 07/01/2020    RPR Non-Reactive 04/06/2020       Psychiatric Review of Systems:    Behavior over the last 24 hours:  unchanged  Sleep: normal  Appetite: normal  Medication side effects: No  ROS: no complaints, denies any shortness of breath or chest pain and all other systems are negative  Mental Status Evaluation:    Appearance:  dressed in hospital attire   Behavior:  pleasant, calm, bizarre, guarded, irritable   Speech:  normal rate and volume   Mood:  irritable, angry   Affect:  constricted   Thought Process:  disorganized, circumstantial, concrete   Thought Content:  bizarre and Anglican delusions, preoccupied   Perceptual Disturbances: no auditory hallucinations, no visual hallucinations, denies when asked, appears preoccupied   Risk Potential: Suicidal ideation - None  Homicidal ideation - None  Potential for aggression - No   Memory:  recent and remote memory grossly intact   Consciousness:  alert and awake   Attention: attention span and concentration appear shorter than expected for age   Insight:  limited   Judgment: limited   Gait/Station: normal gait/station, normal balance   Motor Activity: no abnormal movements       Progress Toward Goals:     No significant improvement    Assessment/Plan   Principal Problem:    Paranoid schizophrenia [address] [address] )      Recommended Treatment: Treatment plan and medication changes discussed and per the attending physician the plan is: 1  Continue with group therapy, milieu therapy and occupational therapy  2  Behavioral Health checks every 7 minutes  3  Continue with current medication regimen  4  Will review labs in the a m   5  Disposition Planning:    Behavioral Health Medications: all current active meds have been reviewed and continue current psychiatric medications        Current Facility-Administered Medications:  haloperidol 5 mg Oral Q8H PRN Theo Mcclure MD   hydrOXYzine HCL 25 mg Oral Q6H PRN Theo Mcclure MD   LORazepam 1 mg Oral Q6H PRN Theo Mcclure MD   nicotine polacrilex 2 mg Oral Q2H PRN Theo Mcclure MD   OLANZapine 10 mg Oral HS Theo Mcclure MD   OLANZapine 5 mg Intramuscular Q6H PRN Theo Mcclure MD   OLANZapine 5 mg Oral Daily Theo Mcclure MD   risperiDONE 1 mg Oral Q8H PRN Theo Mcclure MD   traZODone 50 mg Oral HS PRN Theo Mcclure MD       Risks / Benefits of Treatment:     Risks, benefits, and possible side effects of medications explained to patient and patient verbalizes understanding and agreement for treatment  Counseling / Coordination of Care:     Patient's progress reviewed with nursing staff  Medications, treatment progress and treatment plan reviewed with patient  Supportive counseling provided to the patient            NADEEN Alvarado

## 2020-07-04 NOTE — TREATMENT TEAM
07/04/20 0700   Team Meeting   Meeting Type Daily Rounds   Team Members Present   Team Members Present Nurse; Other (Discipline and Name)  (NADEEN)   Nursing Team Member Dane   Other (Discipline and Name) Patel Muñoz   Patient/Family Present   Patient Present No   Patient's Family Present No   medication management

## 2020-07-04 NOTE — NURSING NOTE
Patient quiet all evening  Behaviors controlled  Offered no complaints this evening  Patient took scheduled HS Zyprexa and went to bed  Patient refused and additional sleep aid when offered and went to bed  Patient continues to rest comfortably and shows no signs of distress at this time  No issues related to sleep

## 2020-07-04 NOTE — PROGRESS NOTES
Progress Note - [address] [place] Brenda Taylor 49 y o  male MRN: [id number]  Unit/Bed#: [facility] 377-01 Encounter: [id number]    Assessment/Plan   Principal Problem:    Paranoid schizophrenia ([address] )      Subjective:Patient was seen today for continuation of care, records reviewed and  patient was discussed with the morning case review team  Ignacia Cramer was seen in his room in bed resting, reported sleeping well, good appetite, remains isolative to self  During our entire interaction, Ignacia Cramer was scant, guarded and evasive with responses and was laying with his back turned to this writer; had poor eye contact and appeared to be preoccupied  Ignacia Cramer denies all psychiatric symptoms states that he was feeling better and was focused on being discharged on Monday  Staff reported that Ignacia Cramer has been out of bed this morning, ate breakfast, was pacing the hallways, remains isolative to self; he is medication compliant and refused a m  blood work  Will continue on current medication regimen  Vitals:  Vitals:    07/04/20 1524   BP: 105/60   Pulse: 77   Resp: 16   Temp: (!) 97 4 °F (36 3 °C)   SpO2:        Laboratory results:    I have personally reviewed all pertinent laboratory/tests results    Most Recent Labs:   Lab Results   Component Value Date    WBC 6 50 07/04/2020    RBC 4 85 07/04/2020    HGB 15 8 07/04/2020    HCT 45 8 07/04/2020     07/04/2020    RDW 13 7 07/04/2020    NEUTROABS 3 20 07/04/2020    SODIUM 138 07/04/2020    K 4 0 07/04/2020     07/04/2020    CO2 25 07/04/2020    BUN 17 07/04/2020    CREATININE 0 87 07/04/2020    GLUC 97 07/04/2020    GLUF 94 04/06/2020    CALCIUM 9 0 07/04/2020    AST 21 07/04/2020    ALT 14 07/04/2020    ALKPHOS 51 07/04/2020    TP 6 9 07/04/2020    ALB 3 8 07/04/2020    TBILI 0 40 07/04/2020    CHOLESTEROL 90 04/06/2020    HDL 33 (L) 04/06/2020    TRIG 70 04/06/2020    LDLCALC 43 04/06/2020    NONHDLC 57 04/06/2020    LITHIUM <0 2 (L) 07/01/2020    [id number] 0 869 07/01/2020    RPR Non-Reactive 04/06/2020       Psychiatric Review of Systems:    Behavior over the last 24 hours:  unchanged  Sleep: normal  Appetite: normal  Medication side effects: No  ROS: no complaints, denies any shortness of breath or chest pain and all other systems are negative  Mental Status Evaluation:    Appearance:  disheveled, wearing hospital clothes   Behavior:  pleasant, calm, bizarre, guarded, evasive   Speech:  normal rate and volume   Mood:  labile   Affect:  constricted   Thought Process:  concrete, poverty of thought   Thought Content:  no overt delusions   Perceptual Disturbances: no auditory hallucinations, no visual hallucinations, denies when asked, appears preoccupied   Risk Potential: Suicidal ideation - None  Homicidal ideation - None  Potential for aggression - No   Memory:  recent and remote memory grossly intact   Consciousness:  alert and awake   Attention: attention span and concentration appear shorter than expected for age   Insight:  limited   Judgment: limited   Gait/Station: normal gait/station, normal balance   Motor Activity: no abnormal movements       Progress Toward Goals:     No significant improvement    Assessment/Plan   Principal Problem:    Paranoid schizophrenia ([facility] )      Recommended Treatment: Treatment plan and medication changes discussed and per the attending physician the plan is: 1  Continue with group therapy, milieu therapy and occupational therapy  2  Behavioral Health checks every 7 minutes  3  Continue with current medication regimen  4  Will review labs in the a m   5  Disposition Planning:    Behavioral Health Medications: all current active meds have been reviewed and continue current psychiatric medications        Current Facility-Administered Medications:  haloperidol 5 mg Oral Q8H PRN Mary Lou Medrano MD   hydrOXYzine HCL 25 mg Oral Q6H PRN Mary Lou Medrano MD   LORazepam 1 mg Oral Q6H PRN Mary Lou Medrano MD   nicotine polacrilex 2 mg Oral Q2H PRN Gerard Lester MD   OLANZapine 10 mg Oral HS Gerard Lester MD   OLANZapine 5 mg Intramuscular Q6H PRN Gerard Lester MD   OLANZapine 5 mg Oral Daily Gerard Lester MD   risperiDONE 1 mg Oral Q8H PRN Gerard Lester MD   traZODone 50 mg Oral HS PRN Gerard Lester MD       Risks / Benefits of Treatment:     Risks, benefits, and possible side effects of medications explained to patient and patient verbalizes understanding and agreement for treatment  Counseling / Coordination of Care:     Patient's progress reviewed with nursing staff  Medications, treatment progress and treatment plan reviewed with patient  Supportive counseling provided to the patient            NADEEN Little

## 2020-07-05 PROBLEM — K02.9 DENTAL CARIES: Chronic | Status: ACTIVE | Noted: 2020-07-05

## 2020-07-05 PROCEDURE — 99232 SBSQ HOSP IP/OBS MODERATE 35: CPT | Performed by: NURSE PRACTITIONER

## 2020-07-05 PROCEDURE — 99222 1ST HOSP IP/OBS MODERATE 55: CPT | Performed by: FAMILY MEDICINE

## 2020-07-05 RX ORDER — ACETAMINOPHEN 325 MG/1
650 TABLET ORAL EVERY 6 HOURS PRN
Status: DISCONTINUED | OUTPATIENT
Start: 2020-07-05 | End: 2020-07-10 | Stop reason: HOSPADM

## 2020-07-05 RX ADMIN — OLANZAPINE 10 MG: 10 TABLET, ORALLY DISINTEGRATING ORAL at 21:01

## 2020-07-05 RX ADMIN — OLANZAPINE 5 MG: 5 TABLET, FILM COATED ORAL at 08:44

## 2020-07-05 NOTE — PROGRESS NOTES
Patient cooperative  He ambulated the unit and at times interacts with peers  He was observed in the dining room watching fireworks with peers  Patient took HS Zyprexa with no issues  He states he wants to let the  know he needs a ride when he gets d/c  He believes he is being d/c Monday or Tuesday

## 2020-07-05 NOTE — PLAN OF CARE
Problem: SAFETY, RESTRAINT - VIOLENT/SELF-DESTRUCTIVE  Goal: Remains free of harm/injury from restraints (Restraint for Violent/Self-Destructive Behavior)  Description  INTERVENTIONS:  - Instruct patient/family regarding restraint use   - Assess and monitor physiologic and psychological status   - Provide interventions and comfort measures to meet assessed patient needs   - Ensure continuous in person monitoring is provided   - Identify and implement measures to help patient regain control  - Assess readiness for release of restraint  Outcome: Progressing  Goal: Returns to optimal restraint-free functioning  Description  INTERVENTIONS:  - Assess the patient's behavior and symptoms that indicate continued need for restraint  - Identify and implement measures to help patient regain control  - Assess readiness for release of restraint   Outcome: Progressing     Problem: PSYCHOSIS  Goal: Will report no hallucinations or delusions  Description  Interventions:  - Administer medication as  ordered  - Every waking shifts and PRN assess for the presence of hallucinations and or delusions  - Assist with reality testing to support increasing orientation  - Assess if patient's hallucinations or delusions are encouraging self-harm or harm to others and intervene as appropriate  Outcome: Progressing     Problem: BEHAVIOR  Goal: Pt/Family maintain appropriate behavior and adhere to behavioral management agreement, if implemented  Description  INTERVENTIONS:  - Assess the family dynamic   - Encourage verbalization of thoughts and concerns in a socially appropriate manner  - Assess patient/family's coping skills and non-compliant behavior (including use of illegal substances)    - Utilize positive, consistent limit setting strategies supporting safety of patient, staff and others  - Initiate consult with Case Management, Spiritual Care or other ancillary services as appropriate  - If a patient's/visitor's behavior jeopardizes the safety of the patient, staff, or others, refer to organization procedure     - Notify Security of behavior or suspected illegal substances which indicate the need for search of the patient and/or belongings  - Encourage participation in the decision making process about a behavioral management agreement; implement if patient meets criteria  Outcome: Progressing     Problem: INVOLUNTARY ADMIT  Goal: Will cooperate with staff recommendations and doctor's orders and will demonstrate appropriate behavior  Description  INTERVENTIONS:  - Treat underlying conditions and offer medication as ordered  - Educate regarding involuntary admission procedures and rules  - Utilize positive consistent limit setting strategies to support patient and staff safety  Outcome: Progressing

## 2020-07-05 NOTE — ASSESSMENT & PLAN NOTE
Patient brought to ER by police on 505 for worsening manifestations of paranoid schizophrenia  Treatment per primary service ([place])

## 2020-07-05 NOTE — ASSESSMENT & PLAN NOTE
Generally poor dentition with multiple teeth absent and visible caries  No apparent abscess  Treat symptomatically with analgesics at this time and follow up as outpatient for routine dental care

## 2020-07-05 NOTE — PROGRESS NOTES
Patient is isolative to room  Med and meal compliant  Patient presents with a constricted affect  He is pleasant upon approach, denies SI/HI, A/V hallucinations, anxiety and depression  Will continue to monitor for changes in current status

## 2020-07-05 NOTE — ASSESSMENT & PLAN NOTE
Patient brought to ER by police on 264 for worsening manifestations of paranoid schizophrenia  Reviewed medical record and met/examined patient  Patient medically cleared for inpatient behavioral health admission

## 2020-07-05 NOTE — ASSESSMENT & PLAN NOTE
Patient brought to ER by police on 010 for worsening manifestations of paranoid schizophrenia  Reviewed medical record and met/examined patient  Patient medically cleared for inpatient behavioral health admission

## 2020-07-05 NOTE — ASSESSMENT & PLAN NOTE
Patient brought to ER by police on 296 for worsening manifestations of paranoid schizophrenia  Treatment per primary service ([place])

## 2020-07-05 NOTE — PLAN OF CARE
Problem: SAFETY, RESTRAINT - VIOLENT/SELF-DESTRUCTIVE  Goal: Remains free of harm/injury from restraints (Restraint for Violent/Self-Destructive Behavior)  Description  INTERVENTIONS:  - Instruct patient/family regarding restraint use   - Assess and monitor physiologic and psychological status   - Provide interventions and comfort measures to meet assessed patient needs   - Ensure continuous in person monitoring is provided   - Identify and implement measures to help patient regain control  - Assess readiness for release of restraint  Outcome: Progressing     Problem: PSYCHOSIS  Goal: Will report no hallucinations or delusions  Description  Interventions:  - Administer medication as  ordered  - Every waking shifts and PRN assess for the presence of hallucinations and or delusions  - Assist with reality testing to support increasing orientation  - Assess if patient's hallucinations or delusions are encouraging self-harm or harm to others and intervene as appropriate  Outcome: Progressing

## 2020-07-05 NOTE — TREATMENT TEAM
07/05/20 0700   Team Meeting   Meeting Type Daily Rounds   Team Members Present   Team Members Present Nurse; Other (Discipline and Name)   Physician Team Member NADEEN   Nursing Team Member Dane   Other (Discipline and Name) Haydee Millard   Patient/Family Present   Patient Present No   Patient's Family Present No   medication management

## 2020-07-05 NOTE — CONSULTS
ConsultCalvert Char Ahmadi 1965, 49 y o  male MRN: [id number]    Unit/Bed#: [facility] 377-01 Encounter: [id number]    Primary Care Provider: Urszula Graham DO   Date and time admitted to hospital: 7/2/2020 11:31 AM      Inpatient consult for Medical Clearance for [facility] patient  Consult performed by: Lesly Hinds MD  Consult ordered by: Ariana Goldstein MD          Dental caries  Assessment & Plan  Generally poor dentition with multiple teeth absent and visible caries  No apparent abscess  Treat symptomatically with analgesics at this time and follow up as outpatient for routine dental care  Medical clearance for psychiatric admission  Assessment & Plan  Patient brought to ER by police on 491 for worsening manifestations of paranoid schizophrenia  Reviewed medical record and met/examined patient  Patient medically cleared for inpatient behavioral health admission  * Paranoid schizophrenia [facility])  Assessment & Plan  Patient brought to ER by police on 946 for worsening manifestations of paranoid schizophrenia  Treatment per primary service ([facility])  VTE Prophylaxis: Reason for no pharmacologic prophylaxis Patient ambulatory; low risk for VTE  / reason for no mechanical VTE prophylaxis Patient ambulatory; low risk for VTE     Recommendations for Discharge:  · Per primary service ([facility])    Counseling / Coordination of Care Time: 30 minutes  Greater than 50% of total time spent on patient counseling and coordination of care  Greater than 15 minutes spent with this patient discussing diagnosis, prognosis, and plan of care  Collaboration of Care: Were Recommendations Directly Discussed with Primary Treatment Team? - Yes     History of Present Illness: Randall Harper is a 49 y o  male who is originally admitted to the [facility] service due to [id number] for increasing manifestations of paranoid schizophrenia   We are consulted for medical clearance for psychiatry admission and medical management  Review of Systems:    Review of Systems   HENT:        Recurrent right maxillary toothache   Psychiatric/Behavioral: Positive for hallucinations  All other systems reviewed and are negative  Past Medical and Surgical History:     Past Medical History:   Diagnosis Date    Bipolar disorder ([facility] )     Psychiatric disorder     Schizophrenia ([facility] )        No past surgical history on file  Meds/Allergies:    all medications and allergies reviewed    Allergies: Allergies   Allergen Reactions    Amoxicillin Dizziness    Penicillins        Social History:     Marital Status: Single    Substance Use History:   Social History     Substance and Sexual Activity   Alcohol Use Yes     Social History     Tobacco Use   Smoking Status Current Every Day Smoker    Packs/day: 1 00   Smokeless Tobacco Never Used     Social History     Substance and Sexual Activity   Drug Use Yes    Types: Methamphetamines       Family History:    non-contributory    Physical Exam:     Vitals:   Blood Pressure: 116/69 (07/05/20 0732)  Pulse: 66 (07/05/20 0732)  Temperature: (!) 97 2 °F (36 2 °C) (07/05/20 0732)  Temp Source: Temporal (07/05/20 0732)  Respirations: 16 (07/05/20 0732)  Height: 5' 10" (177 8 cm) (07/02/20 1800)  Weight - Scale: 92 kg (202 lb 12 8 oz) (07/05/20 0732)  SpO2: 97 % (07/02/20 1800)    Physical Exam   Constitutional: He is oriented to person, place, and time  He appears well-developed and well-nourished  No distress  Pleasant male initially seen ambulating in hallway with no difficulty; walked to multi-purpose room for privacy during interview  Conversing easily, no acute distress  HENT:   Head: Normocephalic and atraumatic  Right Ear: External ear normal    Left Ear: External ear normal    Nose: Nose normal    Mouth/Throat: Abnormal dentition  Poor dentition with multiple teeth absent and visible caries  No apparent abscess     Eyes: Pupils are equal, round, and reactive to light  Conjunctivae and EOM are normal  Right eye exhibits no discharge  Left eye exhibits no discharge  No scleral icterus  Neck: No JVD present  No tracheal deviation present  No thyromegaly present  Cardiovascular: Normal rate, regular rhythm, normal heart sounds and intact distal pulses  Exam reveals no gallop and no friction rub  No murmur heard  Pulmonary/Chest: Effort normal and breath sounds normal  No stridor  No respiratory distress  He has no wheezes  He has no rales  He exhibits no tenderness  Abdominal: Soft  Bowel sounds are normal  He exhibits no distension and no mass  There is no tenderness  There is no rebound and no guarding  Musculoskeletal: He exhibits no edema, tenderness or deformity  Lymphadenopathy:     He has no cervical adenopathy  Neurological: He is alert and oriented to person, place, and time  Skin: Capillary refill takes less than 2 seconds  No rash noted  He is not diaphoretic  No erythema  No pallor  Psychiatric: He has a normal mood and affect  His behavior is normal  Judgment and thought content normal    Nursing note and vitals reviewed  Additional Data:     Lab Results: I have personally reviewed pertinent reports        Results from last 7 days   Lab Units 07/04/20  0643   WBC Thousand/uL 6 50   HEMOGLOBIN g/dL 15 8   HEMATOCRIT % 45 8   PLATELETS Thousands/uL 221   NEUTROS PCT % 49   LYMPHS PCT % 40   MONOS PCT % 6   EOS PCT % 3     Results from last 7 days   Lab Units 07/04/20  0643   SODIUM mmol/L 138   POTASSIUM mmol/L 4 0   CHLORIDE mmol/L 106   CO2 mmol/L 25   BUN mg/dL 17   CREATININE mg/dL 0 87   ANION GAP mmol/L 7   CALCIUM mg/dL 9 0   ALBUMIN g/dL 3 8   TOTAL BILIRUBIN mg/dL 0 40   ALK PHOS U/L 51   ALT U/L 14   AST U/L 21   GLUCOSE RANDOM mg/dL 97         Results from last 7 days   Lab Units 07/01/20  1132   TROPONIN I ng/mL <0 02     No results found for: HGBA1C            Imaging: No recent imaging    No orders to display       EKG, Pathology, and Other Studies Reviewed on Admission:   · EKG: Sinus rhythm, no ischemic abnormalities    ** Please Note: This note has been constructed using a voice recognition system   **

## 2020-07-06 PROCEDURE — 99232 SBSQ HOSP IP/OBS MODERATE 35: CPT | Performed by: PSYCHIATRY & NEUROLOGY

## 2020-07-06 RX ORDER — OLANZAPINE 5 MG/1
5 TABLET, ORALLY DISINTEGRATING ORAL DAILY
Status: DISCONTINUED | OUTPATIENT
Start: 2020-07-07 | End: 2020-07-10 | Stop reason: HOSPADM

## 2020-07-06 RX ADMIN — OLANZAPINE 10 MG: 10 TABLET, ORALLY DISINTEGRATING ORAL at 21:26

## 2020-07-06 RX ADMIN — OLANZAPINE 5 MG: 5 TABLET, FILM COATED ORAL at 08:25

## 2020-07-06 NOTE — TREATMENT TEAM
07/06/20 0700   Team Meeting   Meeting Type Daily Rounds   Team Members Present   Team Members Present Physician;Nurse;; Other (Discipline and Name)   Physician Team Member [facility]   Nursing Team Member [facility] Management Team Member Gal Yoder   Other (Discipline and Name) residents; Chika Fenton Resident   Patient/Family Present   Patient Present No   Patient's Family Present No   med management

## 2020-07-06 NOTE — NURSING NOTE
Patient visible on unit throughout the evening  Affect flat  No delusional thinking noted  No socialization noted  Pleasant upon approach  Denied any unmet needs  HS medication compliant  No PRNs needed  Retreated to his room for sleep  Will monitor

## 2020-07-06 NOTE — PROGRESS NOTES
Patient is compliant at this time with medication and meals  He stays mostly isolated to himself he is not very social at this time  He appears depressed but is denying S I   And Hi He is pleasant to talk to and he is very soft spoken

## 2020-07-06 NOTE — SOCIAL WORK
He Grace, Tianna Forbes, looking to hold 303 appeal hearing at 1300 due to pt being sedated upon admission  303 hearing took place within 3 5 hours of pt arriving to unit due to [facility] request because of holiday scheduling

## 2020-07-06 NOTE — SOCIAL WORK
Worker spoke with Rissa Sotelo from [place] [address] appeal hearing scheduled for 7/7 at 1030  IP Address: [de-identified]  Worker will test run with Rissa Sotelo at [phone]

## 2020-07-06 NOTE — PROGRESS NOTES
Patient received at 1900, and was pleasant upon approach  Patient was visible in milieu as he was observed ambulating the unit, but was not observed socializing with peers, staying isolative to self  Patient denies SI, HI, A/V hallucinations, pain, anxiety, and depression  Patient was cooperative with assessment and medication administration, and continued on q7 minute checks for patient safety  Will continue to monitor and offer therapeutic support

## 2020-07-07 LAB
CHOLEST SERPL-MCNC: 159 MG/DL
EST. AVERAGE GLUCOSE BLD GHB EST-MCNC: 111 MG/DL
HBA1C MFR BLD: 5.5 %
HDLC SERPL-MCNC: 41 MG/DL
LDLC SERPL CALC-MCNC: 99 MG/DL
NONHDLC SERPL-MCNC: 118 MG/DL
TRIGL SERPL-MCNC: 95 MG/DL

## 2020-07-07 PROCEDURE — 83036 HEMOGLOBIN GLYCOSYLATED A1C: CPT | Performed by: NURSE PRACTITIONER

## 2020-07-07 PROCEDURE — 80061 LIPID PANEL: CPT | Performed by: NURSE PRACTITIONER

## 2020-07-07 PROCEDURE — 99232 SBSQ HOSP IP/OBS MODERATE 35: CPT | Performed by: PSYCHIATRY & NEUROLOGY

## 2020-07-07 RX ADMIN — OLANZAPINE 15 MG: 10 TABLET, ORALLY DISINTEGRATING ORAL at 21:23

## 2020-07-07 RX ADMIN — HYDROXYZINE HYDROCHLORIDE 25 MG: 25 TABLET, FILM COATED ORAL at 23:40

## 2020-07-07 RX ADMIN — RISPERIDONE 1 MG: 1 TABLET, ORALLY DISINTEGRATING ORAL at 08:30

## 2020-07-07 RX ADMIN — TRAZODONE HYDROCHLORIDE 50 MG: 50 TABLET ORAL at 21:23

## 2020-07-07 RX ADMIN — OLANZAPINE 5 MG: 5 TABLET, ORALLY DISINTEGRATING ORAL at 08:33

## 2020-07-07 NOTE — TREATMENT TEAM
07/07/20 0700   Team Meeting   Meeting Type Daily Rounds   Team Members Present   Team Members Present Physician;Nurse;; Other (Discipline and Name)   Physician Team Member [facility]   Nursing Team Member [facility] Management Team Member Jeannine Dai   Other (Discipline and Name) Elmira Beckham; residents, med student   Patient/Family Present   Patient Present No   Patient's Family Present No   303 appeal at  [id number]

## 2020-07-07 NOTE — PLAN OF CARE
303 appeal hearing held today with [facility]  's recommendation is up to 5 days further treatment with discretion of the psychiatrist if pt does not appear ready after those 5 days

## 2020-07-08 RX ORDER — LORAZEPAM 1 MG/1
1 TABLET ORAL EVERY 6 HOURS PRN
Status: DISCONTINUED | OUTPATIENT
Start: 2020-07-08 | End: 2020-07-10 | Stop reason: HOSPADM

## 2020-07-08 RX ORDER — HALOPERIDOL 5 MG
5 TABLET ORAL EVERY 8 HOURS PRN
Status: DISCONTINUED | OUTPATIENT
Start: 2020-07-08 | End: 2020-07-10 | Stop reason: HOSPADM

## 2020-07-08 RX ORDER — OLANZAPINE 10 MG/1
5 INJECTION, POWDER, LYOPHILIZED, FOR SOLUTION INTRAMUSCULAR EVERY 6 HOURS PRN
Status: DISCONTINUED | OUTPATIENT
Start: 2020-07-08 | End: 2020-07-10 | Stop reason: HOSPADM

## 2020-07-08 RX ORDER — DIPHENHYDRAMINE HCL 25 MG
50 TABLET ORAL
Status: DISCONTINUED | OUTPATIENT
Start: 2020-07-08 | End: 2020-07-10 | Stop reason: HOSPADM

## 2020-07-08 RX ADMIN — OLANZAPINE 15 MG: 10 TABLET, ORALLY DISINTEGRATING ORAL at 21:15

## 2020-07-08 RX ADMIN — OLANZAPINE 5 MG: 5 TABLET, ORALLY DISINTEGRATING ORAL at 08:10

## 2020-07-08 NOTE — PROGRESS NOTES
Latoya Bermudez  was seen for continuing care and reviewed with staff  Progress Note - [address] Duong 49 y o  male MRN: @MRN   Unit/Bed#: [facility] [place] [address] Encounter: [id number]       Report from staff regarding this patient received and discussed, and records reviewed prior to seeing this patient   Today there was a pill court that supported writer petition to continue inpatient involuntary treatment for 5 more days  The patient was seclusive, guarded, he denied suicidal homicidal thoughts today during the court hearing    Sleep: slept better  Appetite: normal    Medication side effects:no complaints    Mental Status Evaluation:    Appearance:  dressed in hospital attire   Behavior:  guarded   Mood:  improved, depressed, anxious   Affect: constricted    Speech:  decreased rate, slow   Thought Process:  concrete and illogical   Thought Content:  negative thinking, poverty of thought   Perceptual Disturbances: no auditory hallucinations, no visual hallucinations, denies auditory hallucinations when asked, does not appear responding to internal stimuli   Risk Potential: Suicidal ideation - None at present, contracts for safety on the unit  Homicidal ideation - None  Potential for aggression - No   Sensorium:  oriented to person, place and time   Memory:  long term memory grossly intact   Consciousness:  alert and awake   Insight:  impaired   Judgment: impaired due to psychosis   Motor Activity: no abnormal movements   Gait/Station  normal gait/station, normal balance            Laboratory results:  I have personally reviewed all pertinent laboratory results  BMP: No results for input(s): NA, K, CL, CO2, BUN in the last 72 hours      Invalid input(s): CREA, GLU  Vitals:    07/07/20 1521   BP: 104/58   Pulse: 74   Resp: 16   Temp: 97 5 °F (36 4 °C)   SpO2:         Medication Administration - last 24 hours from 07/06/2020 2223 to 07/07/2020 2223       Date/Time Order Dose Route Action Action by 07/07/2020 0830 risperiDONE (RisperDAL M-TABS) dispersible tablet 1 mg 1 mg Oral Given Geovanna Tseericka, LPN     52/57/5921 6806 traZODone (DESYREL) tablet 50 mg 50 mg Oral Given Jens Pride RN     07/07/2020 0581 OLANZapine (ZyPREXA ZYDIS) dispersible tablet 5 mg 5 mg Oral Given Catrina Shah, LPN     49/72/7464 3934 OLANZapine (ZyPREXA ZYDIS) dispersible tablet 15 mg 15 mg Oral Given Jens Pride RN              Assessment/Plan   Principal Problem:    Paranoid schizophrenia ([facility] )  Active Problems:    Medical clearance for psychiatric admission    Dental caries          Current Facility-Administered Medications:     acetaminophen (TYLENOL) tablet 650 mg, 650 mg, Oral, Q6H PRN, Bacilio Jefferson MD    haloperidol (HALDOL) tablet 5 mg, 5 mg, Oral, Q8H PRN, Karie Monsalve MD    hydrOXYzine HCL (ATARAX) tablet 25 mg, 25 mg, Oral, Q6H PRN, Karie Monsalve MD    LORazepam (ATIVAN) tablet 1 mg, 1 mg, Oral, Q6H PRN, Karie Monsalve MD    nicotine polacrilex (NICORETTE) gum 2 mg, 2 mg, Oral, Q2H PRN, Karie Monsalve MD    OLANZapine (ZyPREXA ZYDIS) dispersible tablet 15 mg, 15 mg, Oral, HS, Augustine Donovan MD, 15 mg at 07/07/20 2123    OLANZapine (ZyPREXA ZYDIS) dispersible tablet 5 mg, 5 mg, Oral, Daily, Augustine Donovan MD, 5 mg at 07/07/20 3368    OLANZapine (ZyPREXA) IM injection 5 mg, 5 mg, Intramuscular, Q6H PRN, Karie Monsalve MD    risperiDONE (RisperDAL M-TABS) dispersible tablet 1 mg, 1 mg, Oral, Q8H PRN, Karie Monsalve MD, 1 mg at 07/07/20 0830    traZODone (DESYREL) tablet 50 mg, 50 mg, Oral, HS PRN, Karie Monsalve MD, 50 mg at 07/07/20 2123    Recommended Treatment:  Will increase patient's Zyprexa to 15 mg at bedtime to further treat his psychosis  Patient does not have side effects of his medications  Planned medication and treatment changes: All current active medications have been reviewed    Continue treatment with group therapy, milieu therapy, occupational therapy and medication management  Risks / Benefits of Treatment:    Risks, benefits, and possible side effects of medications explained to patient  Patient has limited understanding of risks and benefits of treatment at this time, but agrees to take medications as prescribed  Planned medication and treatment changes: All current active medications have been reviewed  Continue treatment with group therapy, milieu therapy, occupational therapy and medication management  Counseling / Coordination of Care:    Patient's progress discussed with staff in treatment team meeting  ** Please Note: This note has been constructed using a voice recognition system   **      ---------------------------------------------------------------------------------------------------------------

## 2020-07-08 NOTE — PLAN OF CARE
Problem: PSYCHOSIS  Goal: Will report no hallucinations or delusions  Description  Interventions:  - Administer medication as  ordered  - Every waking shifts and PRN assess for the presence of hallucinations and or delusions  - Assist with reality testing to support increasing orientation  - Assess if patient's hallucinations or delusions are encouraging self-harm or harm to others and intervene as appropriate  Outcome: Progressing     Problem: BEHAVIOR  Goal: Pt/Family maintain appropriate behavior and adhere to behavioral management agreement, if implemented  Description  INTERVENTIONS:  - Assess the family dynamic   - Encourage verbalization of thoughts and concerns in a socially appropriate manner  - Assess patient/family's coping skills and non-compliant behavior (including use of illegal substances)  - Utilize positive, consistent limit setting strategies supporting safety of patient, staff and others  - Initiate consult with Case Management, Spiritual Care or other ancillary services as appropriate  - If a patient's/visitor's behavior jeopardizes the safety of the patient, staff, or others, refer to organization procedure     - Notify Security of behavior or suspected illegal substances which indicate the need for search of the patient and/or belongings  - Encourage participation in the decision making process about a behavioral management agreement; implement if patient meets criteria  Outcome: Progressing     Problem: INVOLUNTARY ADMIT  Goal: Will cooperate with staff recommendations and doctor's orders and will demonstrate appropriate behavior  Description  INTERVENTIONS:  - Treat underlying conditions and offer medication as ordered  - Educate regarding involuntary admission procedures and rules  - Utilize positive consistent limit setting strategies to support patient and staff safety  Outcome: Progressing     Problem: SAFETY, RESTRAINT - VIOLENT/SELF-DESTRUCTIVE  Goal: Remains free of harm/injury from restraints (Restraint for Violent/Self-Destructive Behavior)  Description  INTERVENTIONS:  - Instruct patient/family regarding restraint use   - Assess and monitor physiologic and psychological status   - Provide interventions and comfort measures to meet assessed patient needs   - Ensure continuous in person monitoring is provided   - Identify and implement measures to help patient regain control  - Assess readiness for release of restraint  Outcome: Completed  Goal: Returns to optimal restraint-free functioning  Description  INTERVENTIONS:  - Assess the patient's behavior and symptoms that indicate continued need for restraint  - Identify and implement measures to help patient regain control  - Assess readiness for release of restraint   Outcome: Completed

## 2020-07-08 NOTE — PROGRESS NOTES
PRN Atarax appeared to be effective for anxiety as patient appeared to be asleep in bed within one hour of administration

## 2020-07-08 NOTE — TREATMENT TEAM
07/08/20 0645   Team Meeting   Meeting Type Daily Rounds   Team Members Present   Team Members Present Physician;Nurse;; Other (Discipline and Name)   Physician Team Member [address] [facility]   Nursing Team Member [facility] Management Team Member Neema Lara   Other (Discipline and Name) residents, med student   Patient/Family Present   Patient Present No   Patient's Family Present No   poss D/C Friday

## 2020-07-08 NOTE — PROGRESS NOTES
Patient pleasant and cooperative throughout the shift  Patient denies depression, anxiety, S/HI, and A/VH  Patient visible at times and social with select peers

## 2020-07-08 NOTE — PROGRESS NOTES
Patient was cooperative and isolative during the evening  Patient presented for medications and was pleasant on approach  Patient did not report any complaints or concerns and was compliant with medication  Patient requested PRN Trazadone for sleep, given at 2123  Will continue to monitor closely

## 2020-07-08 NOTE — PROGRESS NOTES
Patient approached staff due to anxiety attributed to continuing inability to fall asleep  PRN Trazadone was ineffective  Patient was administered PRN Atarax 25 mg for anxiety at 2340  Will continue to monitor closely

## 2020-07-08 NOTE — PROGRESS NOTES
Pt denies SI, HI  Pt denies anxiety and depression  Pt is calm, pleasant and cooperative  Pt is visible at times and communicates with staff and select peers  Pt is complaint with medications  Pt is ambulating around the unit  Will continue to monitor

## 2020-07-09 PROCEDURE — 99232 SBSQ HOSP IP/OBS MODERATE 35: CPT | Performed by: PSYCHIATRY & NEUROLOGY

## 2020-07-09 RX ADMIN — OLANZAPINE 15 MG: 10 TABLET, ORALLY DISINTEGRATING ORAL at 21:27

## 2020-07-09 RX ADMIN — OLANZAPINE 5 MG: 5 TABLET, ORALLY DISINTEGRATING ORAL at 09:13

## 2020-07-09 NOTE — DISCHARGE INSTR - OTHER ORDERS
Crisis Information  [address] [place], [facility], [address]   [phone]    [employer] Toll Free: [phone]      What you need to know aboutcoronavirus disease 2019 (COVID-19)     What is coronavirus disease 2019 (COVID-19)? Coronavirus disease 2019 (COVID-19) is a respiratory illness that can spread from person to person  The virus that causes COVID-19 is a novel coronavirus that was first identified during an investigation into an outbreak in [place], [place]  Can people in [place]  get COVID[place]19? Yes  COVID-19 is spreading from person to person in parts of the [place] [facility]  Risk of infection with COVID-19 is higher for people who are close contacts of someone known to have COVID-19, for example healthcare workers, or household members  Other people at higher risk for infection are those who live in or have recently been in an area with ongoing spread of COVID-19  Learn more about places with ongoing spread at   [facility]  html#geographic  Have there been cases of COVID[place]19 in [place] ?   Yes  The first case of COVID-19 in the [place] was reported on January 21, 2020  The current count of cases of COVID-19 in the [place] is available on [employer] at [facility]  How does COVID-19 spread? The virus that causes COVID-19 probably emerged from an animal source, but is now spreading from person to person  The virus is thought to spread mainly between people who are in close contact with one another (within about 6 feet) through respiratory droplets produced when an infected person coughs or sneezes  It also may be possible that a person can get COVID-19 by touching a surface or object that has the virus on it and then touching their own mouth, nose, or possibly their eyes, but this is not thought to be the main way the virus spreads   Learn what is known about the spread of newly emerged coronaviruses at [facility]  What are the symptoms of COVID-19? Patients with COVID-19 have had mild to severe respiratory illness with symptoms of   fever   cough   shortness of breath    What are severe complications from this virus? Some patients have pneumonia in both lungs, multi-organ failure and in some cases death  How can I help protect myself? People can help protect themselves from respiratory illness with everyday preventive actions  Avoid close contact with people who are sick  Avoid touching your eyes, nose, and mouth withunwashed hands  Wash your hands often with soap and water for at least 20 seconds  Use an alcohol-based hand  that contains at least 60% alcohol if soap and water are not available  If you are sick, to keep from spreading respiratory illness to others, you should   Stay home when you are sick  Cover your cough or sneeze with a tissue, then throw the tissue in the trash  Clean and disinfect frequently touched objectsand surfaces  What should I do if I recently traveled from an area with ongoing spread of COVID-19? If you have traveled from an affected area, there may be restrictions on your movements for up to 2 weeks  If you develop symptoms during that period (fever, cough, trouble breathing), seek medical advice  Call the office of your health care provider before you go, and tell them about your travel and your symptoms  They will give you instructions on how to get care without exposing other people to your illness  While sick, avoid contact with people, don't go out and delay any travel to reduce the possibility of spreading illness to others  Is there a vaccine? There is currently no vaccine to protect against COVID-19   The best way to prevent infection is to take everyday preventive actions, like avoiding close contact with people who are sick and washing your hands often    Is there a treatment? There is no specific antiviral treatment for COVID-19  People with COVID-19 can seek medical care to helprelieve symptoms  For more information: www cdc gov/ZVQNM20LC 609486-P 03/03/2020            What to do if you are sick withcoronavirus disease 2019 (COVID-19)     If you are sick with COVID-19 or suspect you are infected with the virus that causes COVID-19, follow the steps below to help prevent the disease from spreading to people in your home and community  Stay home except to get medical care   You should restrict activities outside your home, except for getting medical care  Do not go to work, school, or public areas  Avoid using public transportation, ride-sharing, or taxis  Separate yourself from other people and animals inyour home  People: As much as possible, you should stay in a specific room and away from other people in your home  Also, you should use a separate bathroom, if available  Animals: Do not handle pets or other animals while sick  See COVID-19 and Animals for more information  Call ahead before visiting your doctor   If you have a medical appointment, call the healthcare provider and tell them that you have or may have COVID-19  This will help the healthcare provider's office take steps to keep other people from getting infected or exposed  Wear a facemask  You should wear a facemask when you are around other people (e g , sharing a room or vehicle) or pets and before you enter a healthcare provider's office  If you are not able to wear a facemask (for example, because it causes trouble breathing), then people who live with you should not stay in the same room with you, or they should wear a facemask if they enteryour room  Cover your coughs and sneezes   Cover your mouth and nose with a tissue when you cough or sneeze   Throw used tissues in a lined trash can; immediately wash your hands with soap and water for at least 20 seconds or clean your hands with an alcohol-based hand  that contains at least 60 to 95% alcohol, covering all surfaces of your hands and rubbing them together until they feel dry  Soap and water should be used preferentially if hands are visibly dirty  Avoid sharing personal household items   You should not share dishes, drinking glasses, cups, eating utensils, towels, or bedding with other people or pets in your home  After using these items, they should be washed thoroughly with soap and water  Clean your hands often  Wash your hands often with soap and water for at least 20 seconds  If soap and water are not available, clean your hands with an alcohol-based hand  that contains at least 60% alcohol, covering all surfaces of your hands and rubbing them together until they feel dry  Soap and water should be used preferentially if hands are visibly dirty  Avoid touching your eyes, nose, and mouth with unwashed hands  Clean all "high-touch" surfaces every day  High touch surfaces include counters, tabletops, doorknobs, bathroom fixtures, toilets, phones, keyboards, tablets, and bedside tables  Also, clean any surfaces that may have blood, stool, or body fluids on them  Use a household cleaning spray or wipe, according to the label instructions  Labels contain instructions for safe and effective use of the cleaning product including precautions you should take when applying the product, such as wearing gloves and making sure you have good ventilation during use of the product  Monitor your symptoms  Seek prompt medical attention if your illness is worsening (e g , difficulty breathing)  Before seeking care, call your healthcare provider and tell them that you have, or are being evaluated for, COVID-19  Put on a facemask before you enter the facility  These steps will help the healthcare provider's office to keep other people in the office or waiting room from getting infectedor exposed     Ask your healthcare provider to call the local or state health department  Persons who are placed under active monitoring or facilitated self-monitoring should follow instructions provided by their local health department or occupational health professionals, as appropriate  If you have a medical emergency and need to call 911, notify the dispatch personnel that you have, or are being evaluated for COVID-19  If possible, put on a facemask before emergency medical services arrive  Discontinuing home isolation  Patients with confirmed COVID-19 should remain under home isolation precautions until the risk of secondary transmission to others is thought to be low  The decision to discontinue home isolation precautions should be made on a case-by-case basis, in consultation with healthcare providers and [facility] health departments  For more information: www cdc gov/JXCPE83CW 437408-L 02/24/2020           Stay home when you are sick,except to get medical care  Wash your hands often with soap and water for at least 20 seconds  Cover your cough or sneeze with a tissue, then throw the tissue in the trash  Clean and disinfect frequently touched objects and surfaces  Avoid touching your eyes, nose, and mouth  STOP THE SPREAD OF GERMS  For more information: www cdc gov/COVID19 Avoid close contact with people who are sick  Help prevent the spread of respiratory diseases like COVID-19

## 2020-07-09 NOTE — SOCIAL WORK
Worker spoke with Jessica Hicks 112 through [address] [facility]  Elizabeth Juarez has not made contact with pt since about March or April  ICM having trouble getting through to pt's phone  ICM reports his mom and sister are his only natural supports  ICM reports she is unsure of pt's current living situation but knows he was living in [place]  ICM would like pt to contact her  ICM will follow up with pt after dc next week

## 2020-07-09 NOTE — PLAN OF CARE
Problem: PSYCHOSIS  Goal: Will report no hallucinations or delusions  Description  Interventions:  - Administer medication as  ordered  - Every waking shifts and PRN assess for the presence of hallucinations and or delusions  - Assist with reality testing to support increasing orientation  - Assess if patient's hallucinations or delusions are encouraging self-harm or harm to others and intervene as appropriate  Outcome: Progressing     Problem: BEHAVIOR  Goal: Pt/Family maintain appropriate behavior and adhere to behavioral management agreement, if implemented  Description  INTERVENTIONS:  - Assess the family dynamic   - Encourage verbalization of thoughts and concerns in a socially appropriate manner  - Assess patient/family's coping skills and non-compliant behavior (including use of illegal substances)  - Utilize positive, consistent limit setting strategies supporting safety of patient, staff and others  - Initiate consult with Case Management, Spiritual Care or other ancillary services as appropriate  - If a patient's/visitor's behavior jeopardizes the safety of the patient, staff, or others, refer to organization procedure     - Notify Security of behavior or suspected illegal substances which indicate the need for search of the patient and/or belongings  - Encourage participation in the decision making process about a behavioral management agreement; implement if patient meets criteria  Outcome: Progressing     Problem: INVOLUNTARY ADMIT  Goal: Will cooperate with staff recommendations and doctor's orders and will demonstrate appropriate behavior  Description  INTERVENTIONS:  - Treat underlying conditions and offer medication as ordered  - Educate regarding involuntary admission procedures and rules  - Utilize positive consistent limit setting strategies to support patient and staff safety  Outcome: Progressing

## 2020-07-09 NOTE — SOCIAL WORK
Worker provided pt with Helena Flowres', phone number  Worker encouraged pt to contact her  Pt currently does not have a phone

## 2020-07-09 NOTE — PROGRESS NOTES
Patient isolative and withdrawn  Patient denies all psych symptoms  He is compliant with his medications

## 2020-07-09 NOTE — TREATMENT TEAM
07/09/20 0700   Team Meeting   Meeting Type Daily Rounds   Team Members Present   Team Members Present Physician;Nurse;; Other (Discipline and Name)   Physician Team Member [place] Team Member MALAIKA MIRANDAABDULLAHI [facility] Management Team Member Horacio Ortega   Other (Discipline and Name) residents, med student   Patient/Family Present   Patient Present No   Patient's Family Present No     Pt for d/c tomorrow

## 2020-07-09 NOTE — SOCIAL WORK
Worker requested [facility] for [employer] transport to [address]  (mother's home)
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
## 2020-07-10 VITALS
BODY MASS INDEX: 29.03 KG/M2 | SYSTOLIC BLOOD PRESSURE: 111 MMHG | WEIGHT: 202.8 LBS | OXYGEN SATURATION: 97 % | TEMPERATURE: 97.3 F | HEIGHT: 70 IN | DIASTOLIC BLOOD PRESSURE: 61 MMHG | RESPIRATION RATE: 17 BRPM | HEART RATE: 81 BPM

## 2020-07-10 PROCEDURE — 99238 HOSP IP/OBS DSCHRG MGMT 30/<: CPT | Performed by: PSYCHIATRY & NEUROLOGY

## 2020-07-10 RX ORDER — OLANZAPINE 20 MG/1
20 TABLET ORAL
Qty: 30 TABLET | Refills: 0 | Status: SHIPPED | OUTPATIENT
Start: 2020-07-10 | End: 2020-08-09

## 2020-07-10 RX ADMIN — OLANZAPINE 5 MG: 5 TABLET, ORALLY DISINTEGRATING ORAL at 09:48

## 2020-07-10 NOTE — PROGRESS NOTES
Jocelyn Estrada  was seen for continuing care and reviewed with staff  Progress Note - [address] 49 y o  male MRN: @MRN   Unit/Bed#: Sameer Ventura 462-84 Encounter: [id number]       Report from staff regarding this patient received and discussed, and records reviewed prior to seeing this patient   Patient's condition improved  He was calm  He had long discussion with the writer about his interest to continue his treatment  He knows the name of his outpatient psychiatrist   He  feel safe to be discharged  The patient denied any thoughts of harming himself or other people  Sleep: improved  Appetite: normal    Medication side effects:no complaints    Mental Status Evaluation:    Appearance:  dressed appropriately, casually dressed   Behavior:  cooperative, calm   Mood:  improved, anxious   Affect: normal range and intensity, appropriate    Speech:  normal rate and volume, normal pitch   Thought Process:  concrete   Thought Content:  normal   Perceptual Disturbances: no auditory hallucinations, no visual hallucinations, denies auditory hallucinations when asked, does not appear responding to internal stimuli   Risk Potential: Suicidal ideation - None, contracts for safety on the unit  Homicidal ideation - None  Potential for aggression - No   Sensorium:  oriented to person, place and time   Memory:  recent and remote memory grossly intact   Consciousness:  alert and awake   Insight:  improved   Judgment: improved   Motor Activity: no abnormal movements   Gait/Station  normal gait/station, normal balance            Laboratory results:  I have personally reviewed all pertinent laboratory results  BMP: No results for input(s): NA, K, CL, CO2, BUN in the last 72 hours      Invalid input(s): CREA, GLU  Vitals:    07/09/20 1551   BP: 120/66   Pulse: 70   Resp: 18   Temp: 98 2 °F (36 8 °C)   SpO2:         Medication Administration - last 24 hours from 07/08/2020 2207 to 07/09/2020 2207 Date/Time Order Dose Route Action Action by     07/09/2020 0913 OLANZapine (ZyPREXA ZYDIS) dispersible tablet 5 mg 5 mg Oral Given Ludmila Garcia RN     07/09/2020 2127 OLANZapine (ZyPREXA ZYDIS) dispersible tablet 15 mg 15 mg Oral Given Jens Pride RN              Assessment/Plan   Principal Problem:    Paranoid schizophrenia ([address] )  Active Problems:    Medical clearance for psychiatric admission    Dental caries          Current Facility-Administered Medications:     acetaminophen (TYLENOL) tablet 650 mg, 650 mg, Oral, Q6H PRN, Bacilio Jefferson MD    diphenhydrAMINE (BENADRYL) tablet 50 mg, 50 mg, Oral, HS PRN, Augustine Donovan MD    haloperidol (HALDOL) tablet 5 mg, 5 mg, Oral, Q8H PRN, Augustine Donovan MD    hydrOXYzine HCL (ATARAX) tablet 25 mg, 25 mg, Oral, Q6H PRN, Karie Monsalve MD, 25 mg at 07/07/20 2340    LORazepam (ATIVAN) tablet 1 mg, 1 mg, Oral, Q6H PRN, Augustine Donovan MD    nicotine polacrilex (NICORETTE) gum 2 mg, 2 mg, Oral, Q2H PRN, Karie Monsalve MD    OLANZapine (ZyPREXA ZYDIS) dispersible tablet 15 mg, 15 mg, Oral, HS, Augustine Donovan MD, 15 mg at 07/09/20 2127    OLANZapine (ZyPREXA ZYDIS) dispersible tablet 5 mg, 5 mg, Oral, Daily, Augustine Donovan MD, 5 mg at 07/09/20 0913    OLANZapine (ZyPREXA) IM injection 5 mg, 5 mg, Intramuscular, Q6H PRN, Augustine Donovan MD    risperiDONE (RisperDAL M-TABS) dispersible tablet 1 mg, 1 mg, Oral, Q8H PRN, Karie Monsalve MD, 1 mg at 07/07/20 0830    Recommended Treatment:  Patient's condition improved  Will not make any medication adjustments  The patient feels safe to be discharged there was no concern that the patient may be harmful to other people after his treatment in our inpatient psychiatric unit  Planned medication and treatment changes: All current active medications have been reviewed  Continue treatment with group therapy, milieu therapy, occupational therapy and medication management        Risks / Benefits of Treatment:    Risks, benefits, and possible side effects of medications explained to patient and patient verbalizes understanding and agreement for treatment  Planned medication and treatment changes: All current active medications have been reviewed  Continue treatment with group therapy, milieu therapy, occupational therapy and medication management  Counseling / Coordination of Care:    Patient's progress discussed with staff in treatment team meeting  ** Please Note: This note has been constructed using a voice recognition system   **      ----------------------------------------------------------------------------------------------------------------

## 2020-07-10 NOTE — NURSING NOTE
Patient walked off the unit accompanied by an MHT  Patient leaving via Pavithra Dumont  Patient had his belongings and scripts  AVS reviewed with patient and he verbalized understanding

## 2020-07-10 NOTE — TREATMENT TEAM
07/10/20 0700   Team Meeting   Meeting Type Daily Rounds   Team Members Present   Team Members Present Physician;Nurse;; Other (Discipline and Name)   Physician Team Member [address] [facility]   Nursing Team Member [facility] Management Team Member Rishi Cueto   Other (Discipline and Name) med student   Patient/Family Present   Patient Present No   Patient's Family Present No   D/C at 37 with Dina Vu

## 2020-07-10 NOTE — PROGRESS NOTES
Pt has been visible, social , cooperative and pleasant with staff and peers  Continues to walk around the unit and denies any thoughts or ideations   Will continue to monitor
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
## 2020-07-13 NOTE — SOCIAL WORK
Worker received phone call from [facility] on 7/13  They informed worker that patient does not attend appointments, patient has an appointment scheduled for 7/28 at [phone]    Worker to notify patient's ICM of appointment

## 2020-07-13 NOTE — CASE MANAGEMENT
7/13/2020 @ 12:34PM     [facility] called back with follow up appointment time on 7/28 at [phone]  Pt's phone is not currently activated so worker contacted his [phone] [address]oples, and informed her of follow up appointment

## 2023-12-31 NOTE — PROGRESS NOTES
Progress Note - [address] 49 y o  male MRN: [id number]  Unit/Bed#: Funmilayo Benítez 638-04 Encounter: [id number]    The patient was seen for continuing care and reviewed with treatment team   Staff reports the patient has been calm pleasant and cooperative  He has been medication compliant  He remains paranoid thinking that his friends are all out to get him  He has been seclusive and not attending groups  On approach today he has poor eye contact which is a change for him  He says his mood is alright I guess  He says he is feeling pretty worthless  He remains delusional thinking he is God and says he has been spinning my wheels" and "I put a lot of work in to do what I do" and "it's a wasted cause" as he always ends up in [facility] or the hospital   He has some passive death wishes but denies suicidal intent or plan  He remains religiously preoccupied and paranoid and says that every time he reads a bible the police show up or his friends come looking for him and he has to hide in the woods  He says his appetite is fine  He says he is sleeping well and that he is always tired from too much medication  Current Mental Status Evaluation:  Appearance:  Poor eye contact   Behavior:  calm and cooperative   Mood:  dysphoric   Affect: constricted   Speech: Normal rate and Normal volume   Thought Process: Tangential   Thought Content:  Paranoid and mistrustful, Delusions of persecution and Grandiose delusions   Perceptual Disturbances: Denies hallucinations and does not appear to be responding to internal stimuli   Risk Potential: Hopeless with death wishes   Orientation:   Oriented x 3     Progress Toward Goals:  Psychosis is slowly improving but mood is worse  Principal Problem:    [facility]  Active Problems:    Mixed hyperlipidemia    Tobacco abuse    Marijuana abuse    Intercostal pain      Recommended Treatment:     Continue Zyprexa 25 mg HS      Will consider antidepressant  Continue with pharmacotherapy, group therapy, milieu therapy and occupational therapy    The patient will be maintained on the following medications:    Current Facility-Administered Medications:  acetaminophen 650 mg Oral Q6H PRN NADEEN Soni   aluminum-magnesium hydroxide-simethicone 30 mL Oral Q4H PRN NADEEN Soni   haloperidol 2 mg Oral Q8H PRN Millie Dejesus MD   hydrOXYzine HCL 25 mg Oral Q6H PRN Millie Dejesus MD   LORazepam 1 mg Oral Q8H PRN Millie Dejesus MD   magnesium hydroxide 30 mL Oral Daily PRN NADEEN Soni   nicotine polacrilex 2 mg Oral Q2H PRN Millie Dejesus MD   OLANZapine 25 mg Oral HS NADEEN Soni   OLANZapine 5 mg Oral Q12H PRN Millie Dejesus MD   traZODone 25 mg Oral HS PRN Millie Dejesus MD 31-Dec-2023 11:11
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
